# Patient Record
Sex: FEMALE | Race: WHITE | Employment: OTHER | URBAN - METROPOLITAN AREA
[De-identification: names, ages, dates, MRNs, and addresses within clinical notes are randomized per-mention and may not be internally consistent; named-entity substitution may affect disease eponyms.]

---

## 2019-10-11 ENCOUNTER — OFFICE VISIT (OUTPATIENT)
Dept: FAMILY MEDICINE CLINIC | Facility: CLINIC | Age: 84
End: 2019-10-11
Payer: MEDICARE

## 2019-10-11 VITALS
SYSTOLIC BLOOD PRESSURE: 123 MMHG | WEIGHT: 202 LBS | BODY MASS INDEX: 37.65 KG/M2 | DIASTOLIC BLOOD PRESSURE: 64 MMHG | RESPIRATION RATE: 18 BRPM | TEMPERATURE: 98 F | HEIGHT: 61.5 IN | HEART RATE: 57 BPM | OXYGEN SATURATION: 98 %

## 2019-10-11 DIAGNOSIS — B37.2 CANDIDAL INTERTRIGO: Primary | ICD-10-CM

## 2019-10-11 DIAGNOSIS — Z23 NEED FOR INFLUENZA VACCINATION: ICD-10-CM

## 2019-10-11 PROCEDURE — 90662 IIV NO PRSV INCREASED AG IM: CPT | Performed by: PHYSICIAN ASSISTANT

## 2019-10-11 PROCEDURE — G0008 ADMIN INFLUENZA VIRUS VAC: HCPCS | Performed by: PHYSICIAN ASSISTANT

## 2019-10-11 PROCEDURE — 99202 OFFICE O/P NEW SF 15 MIN: CPT | Performed by: PHYSICIAN ASSISTANT

## 2019-10-11 RX ORDER — CLOTRIMAZOLE 1 %
CREAM (GRAM) TOPICAL
Qty: 28 G | Refills: 0 | Status: SHIPPED | OUTPATIENT
Start: 2019-10-11 | End: 2019-12-09 | Stop reason: ALTCHOICE

## 2019-10-11 RX ORDER — PRAVASTATIN SODIUM 40 MG
40 TABLET ORAL NIGHTLY
COMMUNITY
End: 2019-12-09

## 2019-10-11 RX ORDER — OMEPRAZOLE 40 MG/1
CAPSULE, DELAYED RELEASE ORAL
COMMUNITY
Start: 2019-08-26 | End: 2019-12-09

## 2019-10-11 RX ORDER — FUROSEMIDE 20 MG/1
TABLET ORAL
COMMUNITY
Start: 2019-09-10 | End: 2019-12-10

## 2019-10-11 RX ORDER — POTASSIUM CHLORIDE 20 MEQ/1
TABLET, EXTENDED RELEASE ORAL
COMMUNITY
Start: 2019-07-18 | End: 2019-12-10

## 2019-10-11 RX ORDER — APIXABAN 5 MG/1
TABLET, FILM COATED ORAL
COMMUNITY
Start: 2019-08-15 | End: 2020-02-17

## 2019-10-11 RX ORDER — METOPROLOL SUCCINATE 50 MG/1
TABLET, EXTENDED RELEASE ORAL
COMMUNITY
Start: 2019-08-15 | End: 2019-12-10

## 2019-10-11 NOTE — PATIENT INSTRUCTIONS
Please follow up with your PCP if no improvement within 5-7 days. Go directly to the ER for any acute worsening of symptoms. Candida Skin Infection (Adult)  Candida is type of yeast. It grows naturally on the skin and in the mouth.  If it grows out of c with your healthcare provider, or as advised. Your rash will clear in 7 to 14 days. Call your healthcare provider if the rash is not gone after 14 days.   When to seek medical advice  Call your healthcare provider right away if any of these occur:  · Pain o

## 2019-10-11 NOTE — PROGRESS NOTES
CHIEF COMPLAINT:   Patient presents with:  Rash: redness on abdomen x 1 day      HPI:    Jordyn Polo is a 82year old female who presents for evaluation of a rash. Per patient rash started in the past 1 day. Rash has been constant since onset.   Patient ha LYMPH: Denies enlargement of the lymph nodes. MUSC/SKEL: Denies joint swelling or joint stiffness. GI: Denies abdominal pain, N/V/C/D. NEURO: Denies abnormal sensation, tingling of the skin, or numbness.     EXAM:   /64 (BP Location: Right arm, Sam Steve Please follow up with your PCP if no improvement within 5-7 days. Go directly to the ER for any acute worsening of symptoms. Candida Skin Infection (Adult)  Candida is type of yeast. It grows naturally on the skin and in the mouth.  If it grows out of c Follow up with your healthcare provider, or as advised. Your rash will clear in 7 to 14 days. Call your healthcare provider if the rash is not gone after 14 days.   When to seek medical advice  Call your healthcare provider right away if any of these occur:

## 2019-11-19 ENCOUNTER — HOSPITAL ENCOUNTER (OUTPATIENT)
Age: 84
Discharge: HOME | End: 2019-11-19
Attending: FAMILY MEDICINE
Payer: MEDICARE

## 2019-11-19 ENCOUNTER — OFFICE VISIT (OUTPATIENT)
Dept: FAMILY MEDICINE CLINIC | Facility: CLINIC | Age: 84
End: 2019-11-19
Payer: MEDICARE

## 2019-11-19 ENCOUNTER — APPOINTMENT (OUTPATIENT)
Dept: GENERAL RADIOLOGY | Age: 84
End: 2019-11-19
Attending: FAMILY MEDICINE
Payer: MEDICARE

## 2019-11-19 VITALS
HEIGHT: 62 IN | OXYGEN SATURATION: 98 % | BODY MASS INDEX: 37.73 KG/M2 | RESPIRATION RATE: 20 BRPM | HEART RATE: 70 BPM | DIASTOLIC BLOOD PRESSURE: 63 MMHG | TEMPERATURE: 98 F | SYSTOLIC BLOOD PRESSURE: 127 MMHG | WEIGHT: 205 LBS

## 2019-11-19 VITALS
BODY MASS INDEX: 38 KG/M2 | DIASTOLIC BLOOD PRESSURE: 80 MMHG | TEMPERATURE: 98 F | RESPIRATION RATE: 24 BRPM | WEIGHT: 202 LBS | OXYGEN SATURATION: 95 % | HEART RATE: 75 BPM | SYSTOLIC BLOOD PRESSURE: 122 MMHG

## 2019-11-19 DIAGNOSIS — J45.909 MILD REACTIVE AIRWAYS DISEASE, UNSPECIFIED WHETHER PERSISTENT: Primary | ICD-10-CM

## 2019-11-19 DIAGNOSIS — J84.10 CALCIFIED GRANULOMA OF LUNG (HCC): ICD-10-CM

## 2019-11-19 DIAGNOSIS — R06.2 WHEEZING: Primary | ICD-10-CM

## 2019-11-19 DIAGNOSIS — Z02.9 ENCOUNTERS FOR ADMINISTRATIVE PURPOSES: ICD-10-CM

## 2019-11-19 PROCEDURE — 94640 AIRWAY INHALATION TREATMENT: CPT | Performed by: PHYSICIAN ASSISTANT

## 2019-11-19 PROCEDURE — 99213 OFFICE O/P EST LOW 20 MIN: CPT

## 2019-11-19 PROCEDURE — 94664 DEMO&/EVAL PT USE INHALER: CPT

## 2019-11-19 PROCEDURE — 71046 X-RAY EXAM CHEST 2 VIEWS: CPT | Performed by: FAMILY MEDICINE

## 2019-11-19 PROCEDURE — 99204 OFFICE O/P NEW MOD 45 MIN: CPT

## 2019-11-19 RX ORDER — BENZONATATE 100 MG/1
100 CAPSULE ORAL 3 TIMES DAILY PRN
Qty: 15 CAPSULE | Refills: 0 | Status: SHIPPED | OUTPATIENT
Start: 2019-11-19 | End: 2019-11-24

## 2019-11-19 RX ORDER — IPRATROPIUM BROMIDE AND ALBUTEROL SULFATE 2.5; .5 MG/3ML; MG/3ML
3 SOLUTION RESPIRATORY (INHALATION) ONCE
Status: COMPLETED | OUTPATIENT
Start: 2019-11-19 | End: 2019-11-19

## 2019-11-19 RX ORDER — ALBUTEROL SULFATE 90 UG/1
2 AEROSOL, METERED RESPIRATORY (INHALATION) EVERY 6 HOURS PRN
Qty: 1 INHALER | Refills: 0 | Status: SHIPPED | OUTPATIENT
Start: 2019-11-19 | End: 2019-12-09 | Stop reason: ALTCHOICE

## 2019-11-19 RX ADMIN — IPRATROPIUM BROMIDE AND ALBUTEROL SULFATE 3 ML: 2.5; .5 SOLUTION RESPIRATORY (INHALATION) at 13:07:00

## 2019-11-19 NOTE — PROGRESS NOTES
S:  Patient reports cough x 4 days. She reports cough is productive. She admits to SOB and wheezing. No known fevers.       O:  /80 (BP Location: Left arm, Patient Position: Sitting, Cuff Size: adult)   Pulse 75   Temp 98.1 °F (36.7 °C) (Oral)   Re

## 2019-11-19 NOTE — ED INITIAL ASSESSMENT (HPI)
X4 days Pt c/o coughing and wheezing. Pt seen at [facility], given a NEB and states \"I do fell better after the NEB. \"  Denies fevers

## 2019-11-19 NOTE — ED PROVIDER NOTES
Patient Seen in: [id number] [facility]      History   Patient presents with:  Cough/URI    Stated Complaint: cough, wheezing    HPI  This is an 81 yo F here with complaints of coughing and wheezing.   She has had these symptoms X 4 days and was time  GAIT: Normal        ED Course   Labs Reviewed - No data to display    Orders Placed This Encounter      XR CHEST PA + LAT CHEST (CPT=71046) Once          Order Specific Question: What is the Relevant Clinical Indication / Reason for Exam?          An David Clark MD on 11/19/2019 at 13:30     Approved by: Judith Crockett MD on 11/19/2019 at 13:30          Reviewed Chest XR with the patient. Plan of care as below   Normal sats at this time and good respiratory effort.    MDI instructions provided   Patient v Cap  Take 1 capsule (100 mg total) by mouth 3 (three) times daily as needed for cough., Normal, Disp-15 capsule, R-0

## 2019-12-09 ENCOUNTER — OFFICE VISIT (OUTPATIENT)
Dept: FAMILY MEDICINE CLINIC | Facility: CLINIC | Age: 84
End: 2019-12-09
Payer: MEDICARE

## 2019-12-09 VITALS
BODY MASS INDEX: 39.85 KG/M2 | OXYGEN SATURATION: 98 % | HEIGHT: 60 IN | HEART RATE: 69 BPM | RESPIRATION RATE: 16 BRPM | SYSTOLIC BLOOD PRESSURE: 122 MMHG | WEIGHT: 203 LBS | DIASTOLIC BLOOD PRESSURE: 64 MMHG | TEMPERATURE: 98 F

## 2019-12-09 DIAGNOSIS — K21.9 GASTROESOPHAGEAL REFLUX DISEASE, ESOPHAGITIS PRESENCE NOT SPECIFIED: ICD-10-CM

## 2019-12-09 DIAGNOSIS — J40 BRONCHITIS: ICD-10-CM

## 2019-12-09 DIAGNOSIS — I25.10 ATHEROSCLEROSIS OF NATIVE CORONARY ARTERY OF NATIVE HEART WITHOUT ANGINA PECTORIS: ICD-10-CM

## 2019-12-09 DIAGNOSIS — Z95.5 H/O HEART ARTERY STENT: ICD-10-CM

## 2019-12-09 DIAGNOSIS — I10 ESSENTIAL HYPERTENSION, BENIGN: Primary | ICD-10-CM

## 2019-12-09 DIAGNOSIS — I48.91 ATRIAL FIBRILLATION WITH TACHYCARDIC VENTRICULAR RATE (HCC): ICD-10-CM

## 2019-12-09 DIAGNOSIS — Z23 NEED FOR VACCINATION: ICD-10-CM

## 2019-12-09 PROCEDURE — 99203 OFFICE O/P NEW LOW 30 MIN: CPT | Performed by: FAMILY MEDICINE

## 2019-12-09 PROCEDURE — G0009 ADMIN PNEUMOCOCCAL VACCINE: HCPCS | Performed by: FAMILY MEDICINE

## 2019-12-09 PROCEDURE — 90732 PPSV23 VACC 2 YRS+ SUBQ/IM: CPT | Performed by: FAMILY MEDICINE

## 2019-12-09 RX ORDER — OMEPRAZOLE 40 MG/1
40 CAPSULE, DELAYED RELEASE ORAL DAILY
Qty: 90 CAPSULE | Refills: 1 | Status: SHIPPED | OUTPATIENT
Start: 2019-12-09 | End: 2020-06-06

## 2019-12-09 RX ORDER — PRAVASTATIN SODIUM 40 MG
40 TABLET ORAL NIGHTLY
Qty: 90 TABLET | Refills: 1 | Status: SHIPPED | OUTPATIENT
Start: 2019-12-09 | End: 2019-12-10 | Stop reason: CLARIF

## 2019-12-09 NOTE — PROGRESS NOTES
Jeri Crespo is a 82year old female. Patient presents with:  Establish Care: stomach issues, pain, 2nd pneumonia shot    HPI:   Meron Niño presents to the office with complaints of upper respiratory tract infection, having congestion for 3 weeks.   She has had a Family History   Problem Relation Age of Onset   • Asthma Mother         later age onset   • Heart Disease Mother    • Other (congestive heart failure) Father    • Other (multiple myeloma) Sister 61   • Other (rheumatoid arthritis) Sister       Social [employer] than eliquis, that should come from cards. - may need alternative to toprol XL as it's not on formulary.        Orders Placed This Encounter      Pneumococcal 23, Adult (Pneumovax) (24419) (Dx V03.82/Z23)      Meds & Refills for this Visit:  Requested Pre

## 2019-12-10 ENCOUNTER — TELEPHONE (OUTPATIENT)
Dept: FAMILY MEDICINE CLINIC | Facility: CLINIC | Age: 84
End: 2019-12-10

## 2019-12-10 DIAGNOSIS — I10 ESSENTIAL HYPERTENSION, BENIGN: Primary | ICD-10-CM

## 2019-12-10 DIAGNOSIS — I48.91 ATRIAL FIBRILLATION WITH TACHYCARDIC VENTRICULAR RATE (HCC): ICD-10-CM

## 2019-12-10 DIAGNOSIS — I25.10 ATHEROSCLEROSIS OF NATIVE CORONARY ARTERY OF NATIVE HEART WITHOUT ANGINA PECTORIS: ICD-10-CM

## 2019-12-10 RX ORDER — METOPROLOL SUCCINATE 50 MG/1
50 TABLET, EXTENDED RELEASE ORAL DAILY
Qty: 90 TABLET | Refills: 0 | Status: SHIPPED | OUTPATIENT
Start: 2019-12-10 | End: 2020-03-10

## 2019-12-10 RX ORDER — POTASSIUM CHLORIDE 20 MEQ/1
20 TABLET, EXTENDED RELEASE ORAL DAILY
Qty: 90 TABLET | Refills: 0 | Status: SHIPPED | OUTPATIENT
Start: 2019-12-10 | End: 2020-03-21

## 2019-12-10 RX ORDER — ROSUVASTATIN CALCIUM 20 MG/1
20 TABLET, COATED ORAL NIGHTLY
Qty: 90 TABLET | Refills: 0 | Status: SHIPPED | OUTPATIENT
Start: 2019-12-10 | End: 2020-03-10

## 2019-12-10 RX ORDER — FUROSEMIDE 20 MG/1
20 TABLET ORAL DAILY
Qty: 90 TABLET | Refills: 0 | Status: SHIPPED | OUTPATIENT
Start: 2019-12-10 | End: 2020-03-10

## 2019-12-10 NOTE — TELEPHONE ENCOUNTER
Yrn Jay, DO Maddie Lugo Nurse             Can you confirm how she is taking her meds and what she needs sent to mail order. I would like her to get eliquis from cardiology. Let me know if she needs some to make it until her cardiology appt.

## 2019-12-10 NOTE — TELEPHONE ENCOUNTER
Spoke with patient and confirmed medications:  ASA 81 mg daily  OTC  Furosemide 20 mg daily   Potassium 20 meq daily  Metoprolol XL 50 mg daily  Omeprazole 40 mg daily  Rosuvastatin 20 mg daily  Eliquis 5 mg BID      Per KE, cancel pravastatin script sent

## 2020-01-01 ENCOUNTER — EXTERNAL RECORD (OUTPATIENT)
Dept: HEALTH INFORMATION MANAGEMENT | Facility: OTHER | Age: 85
End: 2020-01-01

## 2020-01-13 ENCOUNTER — MED REC SCAN ONLY (OUTPATIENT)
Dept: FAMILY MEDICINE CLINIC | Facility: CLINIC | Age: 85
End: 2020-01-13

## 2020-01-27 ENCOUNTER — OFFICE VISIT (OUTPATIENT)
Dept: FAMILY MEDICINE CLINIC | Facility: CLINIC | Age: 85
End: 2020-01-27
Payer: MEDICARE

## 2020-01-27 VITALS
RESPIRATION RATE: 16 BRPM | WEIGHT: 207 LBS | HEART RATE: 64 BPM | BODY MASS INDEX: 40 KG/M2 | TEMPERATURE: 97 F | SYSTOLIC BLOOD PRESSURE: 122 MMHG | DIASTOLIC BLOOD PRESSURE: 62 MMHG

## 2020-01-27 DIAGNOSIS — R10.84 GENERALIZED ABDOMINAL PAIN: ICD-10-CM

## 2020-01-27 DIAGNOSIS — R11.2 NON-INTRACTABLE VOMITING WITH NAUSEA, UNSPECIFIED VOMITING TYPE: Primary | ICD-10-CM

## 2020-01-27 DIAGNOSIS — K21.9 GASTROESOPHAGEAL REFLUX DISEASE, ESOPHAGITIS PRESENCE NOT SPECIFIED: ICD-10-CM

## 2020-01-27 LAB
ALBUMIN SERPL-MCNC: 3.4 G/DL (ref 3.4–5)
ALBUMIN/GLOB SERPL: 1 {RATIO} (ref 1–2)
ALP LIVER SERPL-CCNC: 68 U/L (ref 55–142)
ALT SERPL-CCNC: 20 U/L (ref 13–56)
ANION GAP SERPL CALC-SCNC: 2 MMOL/L (ref 0–18)
AST SERPL-CCNC: 14 U/L (ref 15–37)
BASOPHILS # BLD AUTO: 0.06 X10(3) UL (ref 0–0.2)
BASOPHILS NFR BLD AUTO: 1 %
BILIRUB SERPL-MCNC: 0.9 MG/DL (ref 0.1–2)
BUN BLD-MCNC: 14 MG/DL (ref 7–18)
BUN/CREAT SERPL: 16.3 (ref 10–20)
CALCIUM BLD-MCNC: 9.2 MG/DL (ref 8.5–10.1)
CHLORIDE SERPL-SCNC: 108 MMOL/L (ref 98–112)
CO2 SERPL-SCNC: 32 MMOL/L (ref 21–32)
CREAT BLD-MCNC: 0.86 MG/DL (ref 0.55–1.02)
DEPRECATED RDW RBC AUTO: 46.4 FL (ref 35.1–46.3)
EOSINOPHIL # BLD AUTO: 0.13 X10(3) UL (ref 0–0.7)
EOSINOPHIL NFR BLD AUTO: 2.2 %
ERYTHROCYTE [DISTWIDTH] IN BLOOD BY AUTOMATED COUNT: 13.3 % (ref 11–15)
GLOBULIN PLAS-MCNC: 3.3 G/DL (ref 2.8–4.4)
GLUCOSE BLD-MCNC: 91 MG/DL (ref 70–99)
HCT VFR BLD AUTO: 40.8 % (ref 35–48)
HGB BLD-MCNC: 13.1 G/DL (ref 12–16)
IMM GRANULOCYTES # BLD AUTO: 0.01 X10(3) UL (ref 0–1)
IMM GRANULOCYTES NFR BLD: 0.2 %
LIPASE SERPL-CCNC: 140 U/L (ref 73–393)
LYMPHOCYTES # BLD AUTO: 1.59 X10(3) UL (ref 1–4)
LYMPHOCYTES NFR BLD AUTO: 26.5 %
M PROTEIN MFR SERPL ELPH: 6.7 G/DL (ref 6.4–8.2)
MCH RBC QN AUTO: 30.3 PG (ref 26–34)
MCHC RBC AUTO-ENTMCNC: 32.1 G/DL (ref 31–37)
MCV RBC AUTO: 94.4 FL (ref 80–100)
MONOCYTES # BLD AUTO: 0.55 X10(3) UL (ref 0.1–1)
MONOCYTES NFR BLD AUTO: 9.2 %
NEUTROPHILS # BLD AUTO: 3.67 X10 (3) UL (ref 1.5–7.7)
NEUTROPHILS # BLD AUTO: 3.67 X10(3) UL (ref 1.5–7.7)
NEUTROPHILS NFR BLD AUTO: 60.9 %
OSMOLALITY SERPL CALC.SUM OF ELEC: 294 MOSM/KG (ref 275–295)
PATIENT FASTING Y/N/NP: NO
PLATELET # BLD AUTO: 185 10(3)UL (ref 150–450)
POTASSIUM SERPL-SCNC: 4.8 MMOL/L (ref 3.5–5.1)
RBC # BLD AUTO: 4.32 X10(6)UL (ref 3.8–5.3)
SODIUM SERPL-SCNC: 142 MMOL/L (ref 136–145)
WBC # BLD AUTO: 6 X10(3) UL (ref 4–11)

## 2020-01-27 PROCEDURE — 80053 COMPREHEN METABOLIC PANEL: CPT | Performed by: FAMILY MEDICINE

## 2020-01-27 PROCEDURE — 83690 ASSAY OF LIPASE: CPT | Performed by: FAMILY MEDICINE

## 2020-01-27 PROCEDURE — 99214 OFFICE O/P EST MOD 30 MIN: CPT | Performed by: FAMILY MEDICINE

## 2020-01-27 PROCEDURE — 85025 COMPLETE CBC W/AUTO DIFF WBC: CPT | Performed by: FAMILY MEDICINE

## 2020-01-27 NOTE — PROGRESS NOTES
Osie Goodell is a 82year old female. Patient presents with:  Stomach Pain: vomiting, diarrhea      HPI:   A couple weeks ago, after dinner. She was in bed and had saliva building up in her mouth. Got up, vomited and had diarrhea. No fevers.  Stomach felt g HEALTH: feels well no complaints other than as above   SKIN: denies any unusual skin lesions or rashes  RESPIRATORY: denies shortness of breath with exertion  CARDIOVASCULAR: denies chest pain on exertion  GI: denies abdominal pain, as above   NEURO: denie Future          Number of Occurrences: 1          Standing Expiration Date: 1/27/2021      Lipase [E]          Standing Status: Future          Number of Occurrences: 1          Standing Expiration Date: 1/27/2021      *Venipuncture              Meds & Ref

## 2020-02-17 NOTE — TELEPHONE ENCOUNTER
Pt called, needs refill on ELIQUIS 5 MG Oral Tab. Pharmacy Mail order [facility]. Pt also was wondering if we have sent for refills on all her other prescriptions as she had previously asked?    Please call pt at [phone]

## 2020-02-17 NOTE — TELEPHONE ENCOUNTER
Last OV 1/27/2020  Eliquis not filled previously by [facility]    Left message on voicemail/answering machine for patient to call office  Need to confirm dose

## 2020-02-17 NOTE — TELEPHONE ENCOUNTER
Called and spoke with patient who confirmed she takes Eliquis 5 mg BID . Routed to  to refill    Advised patient refills of her other medications were sent to mail order in December.  Patient states she will check to see what she has at home and let of

## 2020-02-19 ENCOUNTER — OFFICE VISIT (OUTPATIENT)
Dept: CARDIOLOGY | Facility: CLINIC | Age: 85
End: 2020-02-19

## 2020-02-19 VITALS
SYSTOLIC BLOOD PRESSURE: 120 MMHG | BODY MASS INDEX: 38.09 KG/M2 | WEIGHT: 207 LBS | HEART RATE: 60 BPM | DIASTOLIC BLOOD PRESSURE: 70 MMHG | HEIGHT: 62 IN

## 2020-02-19 DIAGNOSIS — I48.0 PAF (PAROXYSMAL ATRIAL FIBRILLATION) (CMD): ICD-10-CM

## 2020-02-19 DIAGNOSIS — R01.1 MURMUR: ICD-10-CM

## 2020-02-19 DIAGNOSIS — Z95.5 H/O HEART ARTERY STENT: Primary | ICD-10-CM

## 2020-02-19 DIAGNOSIS — I87.2 VENOUS INSUFFICIENCY (CHRONIC) (PERIPHERAL): ICD-10-CM

## 2020-02-19 DIAGNOSIS — I25.10 CORONARY ARTERY DISEASE INVOLVING NATIVE CORONARY ARTERY OF NATIVE HEART WITHOUT ANGINA PECTORIS: ICD-10-CM

## 2020-02-19 PROCEDURE — 99205 OFFICE O/P NEW HI 60 MIN: CPT | Performed by: INTERNAL MEDICINE

## 2020-02-19 RX ORDER — METOPROLOL SUCCINATE 25 MG/1
25 TABLET, EXTENDED RELEASE ORAL DAILY
Qty: 90 TABLET | Refills: 3 | OUTPATIENT
Start: 2020-02-19

## 2020-02-19 RX ORDER — POTASSIUM CHLORIDE 20 MEQ/1
20 TABLET, EXTENDED RELEASE ORAL
COMMUNITY
Start: 2018-05-17 | End: 2020-02-19 | Stop reason: SDUPTHER

## 2020-02-19 RX ORDER — FUROSEMIDE 20 MG/1
20 TABLET ORAL
COMMUNITY
Start: 2018-07-10 | End: 2020-02-19 | Stop reason: SDUPTHER

## 2020-02-19 RX ORDER — FUROSEMIDE 20 MG/1
20 TABLET ORAL DAILY
Qty: 90 TABLET | Refills: 3 | OUTPATIENT
Start: 2020-02-19

## 2020-02-19 RX ORDER — POTASSIUM CHLORIDE 20 MEQ/1
20 TABLET, EXTENDED RELEASE ORAL DAILY
Qty: 90 TABLET | Refills: 3 | OUTPATIENT
Start: 2020-02-19

## 2020-02-19 RX ORDER — OMEPRAZOLE 20 MG/1
20 CAPSULE, DELAYED RELEASE ORAL
COMMUNITY

## 2020-02-19 RX ORDER — ROSUVASTATIN CALCIUM 20 MG/1
20 TABLET, COATED ORAL DAILY
Qty: 90 TABLET | Refills: 3 | OUTPATIENT
Start: 2020-02-19

## 2020-02-19 RX ORDER — ROSUVASTATIN CALCIUM 20 MG/1
20 TABLET, COATED ORAL
COMMUNITY
Start: 2018-10-03 | End: 2020-02-19 | Stop reason: SDUPTHER

## 2020-02-19 RX ORDER — METOPROLOL SUCCINATE 25 MG/1
25 TABLET, EXTENDED RELEASE ORAL
COMMUNITY
Start: 2017-08-02 | End: 2020-02-19 | Stop reason: SDUPTHER

## 2020-02-19 ASSESSMENT — PATIENT HEALTH QUESTIONNAIRE - PHQ9
SUM OF ALL RESPONSES TO PHQ9 QUESTIONS 1 AND 2: 0
SUM OF ALL RESPONSES TO PHQ9 QUESTIONS 1 AND 2: 0
1. LITTLE INTEREST OR PLEASURE IN DOING THINGS: NOT AT ALL
2. FEELING DOWN, DEPRESSED OR HOPELESS: NOT AT ALL

## 2020-03-02 PROBLEM — R01.1 MURMUR: Status: ACTIVE | Noted: 2020-02-19

## 2020-03-02 PROBLEM — I95.9 HYPOTENSION: Status: ACTIVE | Noted: 2017-12-09

## 2020-03-02 PROBLEM — R94.39 ABNORMAL NUCLEAR STRESS TEST: Status: ACTIVE | Noted: 2018-10-03

## 2020-03-02 PROBLEM — I51.89 DIASTOLIC DYSFUNCTION: Status: ACTIVE | Noted: 2018-10-31

## 2020-03-02 PROBLEM — I48.0 PAROXYSMAL ATRIAL FIBRILLATION (HCC): Status: ACTIVE | Noted: 2017-12-24

## 2020-03-02 PROBLEM — Z79.899 ON STATIN THERAPY DUE TO RISK OF FUTURE CARDIOVASCULAR EVENT: Status: ACTIVE | Noted: 2018-10-31

## 2020-03-02 PROBLEM — I87.2 VENOUS INSUFFICIENCY (CHRONIC) (PERIPHERAL): Status: ACTIVE | Noted: 2020-02-19

## 2020-03-02 PROBLEM — I49.9 ARRHYTHMIA: Status: ACTIVE | Noted: 2017-12-09

## 2020-03-09 DIAGNOSIS — I48.91 ATRIAL FIBRILLATION WITH TACHYCARDIC VENTRICULAR RATE (HCC): ICD-10-CM

## 2020-03-09 DIAGNOSIS — I25.10 ATHEROSCLEROSIS OF NATIVE CORONARY ARTERY OF NATIVE HEART WITHOUT ANGINA PECTORIS: ICD-10-CM

## 2020-03-09 DIAGNOSIS — I10 ESSENTIAL HYPERTENSION, BENIGN: ICD-10-CM

## 2020-03-10 RX ORDER — FUROSEMIDE 20 MG/1
TABLET ORAL
Qty: 90 TABLET | Refills: 3 | Status: SHIPPED | OUTPATIENT
Start: 2020-03-10 | End: 2021-03-04

## 2020-03-10 RX ORDER — METOPROLOL SUCCINATE 50 MG/1
TABLET, EXTENDED RELEASE ORAL
Qty: 90 TABLET | Refills: 3 | Status: SHIPPED | OUTPATIENT
Start: 2020-03-10 | End: 2021-03-04

## 2020-03-10 RX ORDER — ROSUVASTATIN CALCIUM 20 MG/1
TABLET, COATED ORAL
Qty: 90 TABLET | Refills: 3 | Status: SHIPPED | OUTPATIENT
Start: 2020-03-10 | End: 2021-03-04

## 2020-03-10 NOTE — TELEPHONE ENCOUNTER
Last refill on Metoprolol #90 with 0 refills on 12 10 2019   Last refill on Furosemide #90 with 0 refills on 12 10 2019   Last refill on Rosuvastatin #90 with 0 refills on 12 10 2019  Do not see a lipid Panel on file   Last OV on 1 27 2020 - Sick visit

## 2020-03-20 DIAGNOSIS — I10 ESSENTIAL HYPERTENSION, BENIGN: ICD-10-CM

## 2020-03-21 RX ORDER — POTASSIUM CHLORIDE 20 MEQ/1
TABLET, EXTENDED RELEASE ORAL
Qty: 90 TABLET | Refills: 3 | Status: SHIPPED | OUTPATIENT
Start: 2020-03-21 | End: 2021-03-16

## 2020-03-21 NOTE — TELEPHONE ENCOUNTER
Refill request per protocol: none  Last refilled: 12/10/19 #90  Last OV: 1/27/20  No future appt  Routed to provider to advise

## 2020-05-12 NOTE — TELEPHONE ENCOUNTER
apixaban (ELIQUIS) 5 MG Oral Tab    Pt would like a 90 day supply.    Pt [facility]   [facility] [phone], [phone]

## 2020-06-06 DIAGNOSIS — K21.9 GASTROESOPHAGEAL REFLUX DISEASE, ESOPHAGITIS PRESENCE NOT SPECIFIED: ICD-10-CM

## 2020-06-06 RX ORDER — OMEPRAZOLE 40 MG/1
CAPSULE, DELAYED RELEASE ORAL
Qty: 90 CAPSULE | Refills: 3 | Status: SHIPPED | OUTPATIENT
Start: 2020-06-06 | End: 2021-07-26

## 2020-06-06 NOTE — TELEPHONE ENCOUNTER
Pls confirm dose she is taking. She had a recent telemed visit with Dr. Radha Wayne. How is she taking the omeprazole?

## 2020-08-10 RX ORDER — APIXABAN 5 MG/1
TABLET, FILM COATED ORAL
Qty: 180 TABLET | Refills: 3 | Status: SHIPPED | OUTPATIENT
Start: 2020-08-10 | End: 2021-08-05

## 2020-08-11 ENCOUNTER — TELEPHONE (OUTPATIENT)
Dept: FAMILY MEDICINE CLINIC | Facility: CLINIC | Age: 85
End: 2020-08-11

## 2020-08-11 NOTE — TELEPHONE ENCOUNTER
Fax from [employer] requesting information to determine coverage for eliquis.     Form in KE bin

## 2020-08-11 NOTE — TELEPHONE ENCOUNTER
Patient returned call. States she has never been on coumadin and does not recall being on any other blood thinner.

## 2020-08-13 ENCOUNTER — TELEPHONE (OUTPATIENT)
Dept: FAMILY MEDICINE CLINIC | Facility: CLINIC | Age: 85
End: 2020-08-13

## 2020-08-13 NOTE — TELEPHONE ENCOUNTER
Patient calling to schedule appt for head pain x 1 week. Spoke with patient who states she has had left sided head pain x 1 week. Rates pain 2/10  Pain is constant. Pain has not worsened.     appt scheduled  Future Appointments   Date Time Provider Dep

## 2020-08-13 NOTE — TELEPHONE ENCOUNTER
Fax from [employer] stating patient eliquis has been approved from [id number]    Patient notified and verbalized understanding.

## 2020-08-13 NOTE — TELEPHONE ENCOUNTER
Will see her tomorrow. If worsening, unbearable, or worst headache of her life, then go to ER right away.

## 2020-08-14 ENCOUNTER — OFFICE VISIT (OUTPATIENT)
Dept: FAMILY MEDICINE CLINIC | Facility: CLINIC | Age: 85
End: 2020-08-14
Payer: MEDICARE

## 2020-08-14 VITALS
WEIGHT: 208 LBS | TEMPERATURE: 98 F | BODY MASS INDEX: 39 KG/M2 | RESPIRATION RATE: 16 BRPM | HEART RATE: 64 BPM | DIASTOLIC BLOOD PRESSURE: 70 MMHG | SYSTOLIC BLOOD PRESSURE: 130 MMHG

## 2020-08-14 DIAGNOSIS — L98.9 SKIN LESION OF SCALP: ICD-10-CM

## 2020-08-14 DIAGNOSIS — G44.209 TENSION HEADACHE: Primary | ICD-10-CM

## 2020-08-14 PROCEDURE — 99214 OFFICE O/P EST MOD 30 MIN: CPT | Performed by: FAMILY MEDICINE

## 2020-08-14 RX ORDER — BUPRENORPHINE HCL 8 MG/1
1 TABLET SUBLINGUAL DAILY
COMMUNITY

## 2020-08-14 NOTE — PROGRESS NOTES
James Casas is a 82year old female. Patient presents with:  Headache: left occipital area times 1 week 2/10 pain constant      HPI:   Headache 1-2/10 across the back of her head. Yesterday felt her heart pulsing in back of her head.    Tylenol helped so Last 6 Encounters:  08/14/20 : 130/70  03/02/20 : 128/71  01/27/20 : 122/62  12/09/19 : 122/64  11/19/19 : 127/63  11/19/19 : 122/80      Wt Readings from Last 6 Encounters:  08/14/20 : 208 lb (94.3 kg)  05/07/20 : 204 lb (92.5 kg)  03/02/20 : 206 lb 3.2 o

## 2020-12-07 ENCOUNTER — OFFICE VISIT (OUTPATIENT)
Dept: FAMILY MEDICINE CLINIC | Facility: CLINIC | Age: 85
End: 2020-12-07
Payer: MEDICARE

## 2020-12-07 VITALS
SYSTOLIC BLOOD PRESSURE: 120 MMHG | TEMPERATURE: 98 F | WEIGHT: 205 LBS | DIASTOLIC BLOOD PRESSURE: 62 MMHG | RESPIRATION RATE: 16 BRPM | OXYGEN SATURATION: 99 % | HEIGHT: 62 IN | BODY MASS INDEX: 37.73 KG/M2 | HEART RATE: 66 BPM

## 2020-12-07 DIAGNOSIS — Z79.899 ON STATIN THERAPY DUE TO RISK OF FUTURE CARDIOVASCULAR EVENT: ICD-10-CM

## 2020-12-07 DIAGNOSIS — R01.1 MURMUR: ICD-10-CM

## 2020-12-07 DIAGNOSIS — I25.2 H/O ACUTE MYOCARDIAL INFARCTION: ICD-10-CM

## 2020-12-07 DIAGNOSIS — Z95.5 H/O HEART ARTERY STENT: ICD-10-CM

## 2020-12-07 DIAGNOSIS — I87.2 VENOUS INSUFFICIENCY (CHRONIC) (PERIPHERAL): ICD-10-CM

## 2020-12-07 DIAGNOSIS — I48.0 PAROXYSMAL ATRIAL FIBRILLATION (HCC): ICD-10-CM

## 2020-12-07 DIAGNOSIS — Z95.5 PRESENCE OF CORONARY ANGIOPLASTY IMPLANT AND GRAFT: ICD-10-CM

## 2020-12-07 DIAGNOSIS — K21.9 GASTROESOPHAGEAL REFLUX DISEASE WITHOUT ESOPHAGITIS: ICD-10-CM

## 2020-12-07 DIAGNOSIS — Z00.00 ENCOUNTER FOR ANNUAL HEALTH EXAMINATION: Primary | ICD-10-CM

## 2020-12-07 DIAGNOSIS — I25.10 CORONARY ARTERY DISEASE INVOLVING NATIVE CORONARY ARTERY OF NATIVE HEART WITHOUT ANGINA PECTORIS: ICD-10-CM

## 2020-12-07 DIAGNOSIS — I10 ESSENTIAL HYPERTENSION, BENIGN: ICD-10-CM

## 2020-12-07 DIAGNOSIS — I51.89 DIASTOLIC DYSFUNCTION: ICD-10-CM

## 2020-12-07 PROBLEM — R94.39 ABNORMAL NUCLEAR STRESS TEST: Status: RESOLVED | Noted: 2018-10-03 | Resolved: 2020-12-07

## 2020-12-07 PROBLEM — I95.9 HYPOTENSION: Status: RESOLVED | Noted: 2017-12-09 | Resolved: 2020-12-07

## 2020-12-07 PROBLEM — I49.9 ARRHYTHMIA: Status: RESOLVED | Noted: 2017-12-09 | Resolved: 2020-12-07

## 2020-12-07 PROCEDURE — 85025 COMPLETE CBC W/AUTO DIFF WBC: CPT | Performed by: FAMILY MEDICINE

## 2020-12-07 PROCEDURE — 80053 COMPREHEN METABOLIC PANEL: CPT | Performed by: FAMILY MEDICINE

## 2020-12-07 PROCEDURE — G0439 PPPS, SUBSEQ VISIT: HCPCS | Performed by: FAMILY MEDICINE

## 2020-12-07 PROCEDURE — 80061 LIPID PANEL: CPT | Performed by: FAMILY MEDICINE

## 2020-12-07 NOTE — PROGRESS NOTES
HPI:   Varun Garza is a 82year old female who presents for a Medicare Subsequent Annual Wellness visit (Pt already had Initial Annual Wellness). Would like to get off of medications. Asking to stop statin and metoprolol and water pill.    Has always forms available to patient in AVS       She smoked tobacco in the past but quit greater than 12 months ago.   Social History    Tobacco Use      Smoking status: Former Smoker        Packs/day: 0.50        Years: 30.00        Pack years: 15      Smokeless to CREATSERUM 0.73 12/07/2020    GLU 98 12/07/2020        CBC  (most recent labs)   Lab Results   Component Value Date    WBC 5.5 12/07/2020    HGB 13.5 12/07/2020    .0 12/07/2020        ALLERGIES:   She is allergic to lipitor [atorvastatin].     Minal Lucio exertion  CARDIOVASCULAR: denies chest pain on exertion  GI: denies abdominal pain, denies heartburn  : denies dysuria, vaginal discharge or itching, + urinary incontinence, she wears pads most of the time for leaks.     MUSCULOSKELETAL: denies back pain atraumatic, no cyanosis or edema   Pulses: 2+ and symmetric   Skin: Skin color, texture, turgor normal, no rashes or lesions   Lymph nodes: Cervical, supraclavicular, and axillary nodes normal   Neurologic: Normal       Vaccination History     Immunization Diet assessment: good     PLAN:  The patient indicates understanding of these issues and agrees to the plan. Reinforced healthy diet, lifestyle, and exercise. Return for Wellness Visit.      Yanet Raymundo DO, 12/7/2020     General Health     In the older at high risk There are no preventive care reminders to display for this patient. Update Health Maintenance if applicable    Chlamydia  Annually if high risk No results found for: CHLAMYDIA No flowsheet data found.     Screening Mammogram      Vandana Oquendo [address] [[id number]]

## 2020-12-07 NOTE — PATIENT INSTRUCTIONS
Avis Rios's SCREENING SCHEDULE   Tests on this list are recommended by your physician but may not be covered, or covered at this frequency, by your insurer. Please check with your insurance carrier before scheduling to verify coverage.    PREVENTATIVE Sigmoidoscopy Screen  Covered every 5 years No results found for this or any previous visit. No flowsheet data found. Fecal Occult Blood   Covered Annually No results found for: FOB, OCCULTSTOOL No flowsheet data found.      Barium Enema-   uncomfortabl on 12/09/19   • PNEUMOCOCCAL IMM (PNEUMOVAX)    Please get once after your 65th birthday    Hepatitis B for Moderate/High Risk       No orders found for this or any previous visit.  Medium/high risk factors:   End-stage renal disease   Hemophiliacs who rece

## 2020-12-09 ENCOUNTER — TELEPHONE (OUTPATIENT)
Dept: FAMILY MEDICINE CLINIC | Facility: CLINIC | Age: 85
End: 2020-12-09

## 2020-12-09 NOTE — TELEPHONE ENCOUNTER
Pt called, Pt returning our call from yesterday-no phone note in system. Please call pt at [phone].

## 2021-02-01 DIAGNOSIS — Z23 NEED FOR VACCINATION: ICD-10-CM

## 2021-02-11 ENCOUNTER — TELEPHONE (OUTPATIENT)
Dept: FAMILY MEDICINE CLINIC | Facility: CLINIC | Age: 86
End: 2021-02-11

## 2021-02-11 NOTE — TELEPHONE ENCOUNTER
Pt called to request a refill of the following medication in a 90 day supply.     ELIQUIS 5 MG Oral Tab    [address] [phone], [phone]

## 2021-02-25 ENCOUNTER — TELEPHONE (OUTPATIENT)
Dept: CARDIOLOGY | Age: 86
End: 2021-02-25

## 2021-03-04 DIAGNOSIS — I25.10 ATHEROSCLEROSIS OF NATIVE CORONARY ARTERY OF NATIVE HEART WITHOUT ANGINA PECTORIS: ICD-10-CM

## 2021-03-04 DIAGNOSIS — I48.91 ATRIAL FIBRILLATION WITH TACHYCARDIC VENTRICULAR RATE (HCC): ICD-10-CM

## 2021-03-04 DIAGNOSIS — I10 ESSENTIAL HYPERTENSION, BENIGN: ICD-10-CM

## 2021-03-04 RX ORDER — FUROSEMIDE 20 MG/1
TABLET ORAL
Qty: 90 TABLET | Refills: 3 | Status: SHIPPED | OUTPATIENT
Start: 2021-03-04 | End: 2022-01-27

## 2021-03-04 RX ORDER — ROSUVASTATIN CALCIUM 20 MG/1
TABLET, COATED ORAL
Qty: 90 TABLET | Refills: 3 | Status: SHIPPED | OUTPATIENT
Start: 2021-03-04 | End: 2022-01-27

## 2021-03-04 RX ORDER — METOPROLOL SUCCINATE 50 MG/1
TABLET, EXTENDED RELEASE ORAL
Qty: 90 TABLET | Refills: 3 | Status: SHIPPED | OUTPATIENT
Start: 2021-03-04 | End: 2022-01-27

## 2021-03-04 NOTE — TELEPHONE ENCOUNTER
Hypertension Medications Protocol Htcvjh4203/04/2021 12:04 AM   CMP or BMP in past 12 months Protocol Details    Last serum creatinine< 2.0     Appointment in past 6 or next 3 months         Cholesterol Medication Protocol [id number]/04/2021 12:04 AM   ALT < 8

## 2021-03-16 DIAGNOSIS — I10 ESSENTIAL HYPERTENSION, BENIGN: ICD-10-CM

## 2021-03-16 RX ORDER — POTASSIUM CHLORIDE 20 MEQ/1
TABLET, EXTENDED RELEASE ORAL
Qty: 90 TABLET | Refills: 3 | Status: SHIPPED | OUTPATIENT
Start: 2021-03-16 | End: 2022-01-27

## 2021-03-16 NOTE — TELEPHONE ENCOUNTER
Protocol: none  Last refilled 3/21/20 #90 with 3 RF  LOV with KE 12/7/20  No future appt  Routed to PCP to advise

## 2021-03-18 ENCOUNTER — TELEPHONE (OUTPATIENT)
Dept: FAMILY MEDICINE CLINIC | Facility: CLINIC | Age: 86
End: 2021-03-18

## 2021-03-18 ENCOUNTER — OFFICE VISIT (OUTPATIENT)
Dept: FAMILY MEDICINE CLINIC | Facility: CLINIC | Age: 86
End: 2021-03-18
Payer: MEDICARE

## 2021-03-18 VITALS
WEIGHT: 207 LBS | RESPIRATION RATE: 16 BRPM | TEMPERATURE: 98 F | DIASTOLIC BLOOD PRESSURE: 60 MMHG | HEART RATE: 68 BPM | BODY MASS INDEX: 38 KG/M2 | OXYGEN SATURATION: 97 % | SYSTOLIC BLOOD PRESSURE: 124 MMHG

## 2021-03-18 DIAGNOSIS — K57.92 DIVERTICULITIS: Primary | ICD-10-CM

## 2021-03-18 DIAGNOSIS — R10.32 LEFT LOWER QUADRANT ABDOMINAL PAIN: ICD-10-CM

## 2021-03-18 LAB
BASOPHILS # BLD AUTO: 0.02 X10(3) UL (ref 0–0.2)
BASOPHILS NFR BLD AUTO: 0.3 %
DEPRECATED RDW RBC AUTO: 47.3 FL (ref 35.1–46.3)
EOSINOPHIL # BLD AUTO: 0.1 X10(3) UL (ref 0–0.7)
EOSINOPHIL NFR BLD AUTO: 1.4 %
ERYTHROCYTE [DISTWIDTH] IN BLOOD BY AUTOMATED COUNT: 13.6 % (ref 11–15)
HCT VFR BLD AUTO: 39.2 %
HGB BLD-MCNC: 12.4 G/DL
IMM GRANULOCYTES # BLD AUTO: 0.01 X10(3) UL (ref 0–1)
IMM GRANULOCYTES NFR BLD: 0.1 %
LYMPHOCYTES # BLD AUTO: 1.28 X10(3) UL (ref 1–4)
LYMPHOCYTES NFR BLD AUTO: 18.5 %
MCH RBC QN AUTO: 30 PG (ref 26–34)
MCHC RBC AUTO-ENTMCNC: 31.6 G/DL (ref 31–37)
MCV RBC AUTO: 94.9 FL
MONOCYTES # BLD AUTO: 0.71 X10(3) UL (ref 0.1–1)
MONOCYTES NFR BLD AUTO: 10.2 %
NEUTROPHILS # BLD AUTO: 4.81 X10 (3) UL (ref 1.5–7.7)
NEUTROPHILS # BLD AUTO: 4.81 X10(3) UL (ref 1.5–7.7)
NEUTROPHILS NFR BLD AUTO: 69.5 %
PLATELET # BLD AUTO: 147 10(3)UL (ref 150–450)
RBC # BLD AUTO: 4.13 X10(6)UL
WBC # BLD AUTO: 6.9 X10(3) UL (ref 4–11)

## 2021-03-18 PROCEDURE — 80053 COMPREHEN METABOLIC PANEL: CPT | Performed by: FAMILY MEDICINE

## 2021-03-18 PROCEDURE — 83690 ASSAY OF LIPASE: CPT | Performed by: FAMILY MEDICINE

## 2021-03-18 PROCEDURE — 99214 OFFICE O/P EST MOD 30 MIN: CPT | Performed by: FAMILY MEDICINE

## 2021-03-18 PROCEDURE — 85025 COMPLETE CBC W/AUTO DIFF WBC: CPT | Performed by: FAMILY MEDICINE

## 2021-03-18 RX ORDER — METRONIDAZOLE 500 MG/1
500 TABLET ORAL 3 TIMES DAILY
Qty: 30 TABLET | Refills: 0 | Status: SHIPPED | OUTPATIENT
Start: 2021-03-18 | End: 2021-03-28

## 2021-03-18 RX ORDER — CIPROFLOXACIN 500 MG/1
500 TABLET, FILM COATED ORAL 2 TIMES DAILY
Qty: 20 TABLET | Refills: 0 | Status: SHIPPED | OUTPATIENT
Start: 2021-03-18 | End: 2021-03-28

## 2021-03-18 NOTE — TELEPHONE ENCOUNTER
Patient notified and scheduled appt    Future Appointments   Date Time Provider Jaye Tripathi   3/18/2021  3:30 PM Tio Tilley [facility] WALTER Healy

## 2021-03-18 NOTE — TELEPHONE ENCOUNTER
I would rather see her before treating. Could she make it at 3:30? Otherwise I can probably get her in at 4:15 or so.

## 2021-03-18 NOTE — TELEPHONE ENCOUNTER
Pt is having some diverticulitis problems. She would like to know if she can be seen later today, he doesn't want to take her  with or leave him home alone.      Please return call to [phone]

## 2021-03-18 NOTE — TELEPHONE ENCOUNTER
Patient states she has pain in her left side. States it feels like diverticulitis. Has had this before when she lived in [address]. Pain is on the lower left side. Started last night in bed. Activity does not make it better or worse.   Aquiles

## 2021-03-18 NOTE — PROGRESS NOTES
Bebe Ortega is a 82year old female. Patient presents with:  Abdominal Pain: left abdominal pain      HPI:   Has h/o diverticulitis. Has been hospitalized in the past.   Has had 3-4 episodes, this is the first one she's had for 20 yrs.      Ate a lot of Years: 30.00        Pack years: 15      Smokeless tobacco: Former User      Tobacco comment: quit 30 yrs ago    Vaping Use      Vaping Use: Never used    Alcohol use:  Yes      Alcohol/week: 2.0 standard drinks      Types: 2 Glasses of wine per week    Drug (two) times daily for 10 days. Left lower quadrant abdominal pain  -     CBC WITH DIFFERENTIAL WITH PLATELET; Future  -     COMP METABOLIC PANEL (14); Future  -     LIPASE; Future  -     VENIPUNCTURE    labs as ordered. meds as above.    Go to ER if wo

## 2021-03-19 LAB
ALBUMIN SERPL-MCNC: 3.5 G/DL (ref 3.4–5)
ALBUMIN/GLOB SERPL: 1.2 {RATIO} (ref 1–2)
ALP LIVER SERPL-CCNC: 64 U/L
ALT SERPL-CCNC: 22 U/L
ANION GAP SERPL CALC-SCNC: 3 MMOL/L (ref 0–18)
AST SERPL-CCNC: 15 U/L (ref 15–37)
BILIRUB SERPL-MCNC: 1.3 MG/DL (ref 0.1–2)
BUN BLD-MCNC: 14 MG/DL (ref 7–18)
BUN/CREAT SERPL: 17.5 (ref 10–20)
CALCIUM BLD-MCNC: 8.8 MG/DL (ref 8.5–10.1)
CHLORIDE SERPL-SCNC: 106 MMOL/L (ref 98–112)
CO2 SERPL-SCNC: 32 MMOL/L (ref 21–32)
CREAT BLD-MCNC: 0.8 MG/DL
GLOBULIN PLAS-MCNC: 3 G/DL (ref 2.8–4.4)
GLUCOSE BLD-MCNC: 120 MG/DL (ref 70–99)
LIPASE SERPL-CCNC: 106 U/L (ref 73–393)
M PROTEIN MFR SERPL ELPH: 6.5 G/DL (ref 6.4–8.2)
OSMOLALITY SERPL CALC.SUM OF ELEC: 294 MOSM/KG (ref 275–295)
PATIENT FASTING Y/N/NP: NO
POTASSIUM SERPL-SCNC: 4 MMOL/L (ref 3.5–5.1)
SODIUM SERPL-SCNC: 141 MMOL/L (ref 136–145)

## 2021-03-20 ENCOUNTER — TELEPHONE (OUTPATIENT)
Dept: FAMILY MEDICINE CLINIC | Facility: CLINIC | Age: 86
End: 2021-03-20

## 2021-03-20 NOTE — TELEPHONE ENCOUNTER
It's probably the Metronidazole, is she taking it with food? And is she drinking any ETOH while she's taking it? If she's doing everything right , then she might just try taking JUst  the CIPRO.

## 2021-03-20 NOTE — TELEPHONE ENCOUNTER
Pt was seen in the office on 03/18 for her diverticulitis. KE gave her two medication to start taking. Pt says they are making her sick.  Please call back

## 2021-03-20 NOTE — TELEPHONE ENCOUNTER
PT is taking Metronidazole with food. No alcohol. Pt will try just the Cipro. She is to call back if she does not start feeling better next week.

## 2021-03-26 ENCOUNTER — TELEPHONE (OUTPATIENT)
Dept: FAMILY MEDICINE CLINIC | Facility: CLINIC | Age: 86
End: 2021-03-26

## 2021-03-26 NOTE — TELEPHONE ENCOUNTER
Patient notified and verbalized understanding.      Patient asking if she can stop the cipro  Routed to KE to advise

## 2021-03-26 NOTE — TELEPHONE ENCOUNTER
Pt called, said she was told to stop one medication and start another medication, but she feels it maybe too strong as she thinks there is blood in her stool. Please call pt at [phone].   Pt has a hair appt this afternoon at 2 and could stop by for a

## 2021-03-26 NOTE — TELEPHONE ENCOUNTER
Spoke with patient who states for the last 3 days she has had black formed/soft stool. States symptoms started 3 days ago. Patient states abd pain has stopped. Was on 3 day liquid diet, started solid foods 2 days ago.   Stools were black prior to startin

## 2021-03-26 NOTE — TELEPHONE ENCOUNTER
Advised pt to stop cipro and reinforced other rec's from below. Pt asked if she has to take iron pills because it makes stools dark and her stools are already dark. Per Dr. Devyn Stratton, ok to hold off on OTC iron for now. Pt verbalized understanding.  No furt

## 2021-04-01 ENCOUNTER — OFFICE VISIT (OUTPATIENT)
Dept: FAMILY MEDICINE CLINIC | Facility: CLINIC | Age: 86
End: 2021-04-01
Payer: MEDICARE

## 2021-04-01 ENCOUNTER — TELEPHONE (OUTPATIENT)
Dept: FAMILY MEDICINE CLINIC | Facility: CLINIC | Age: 86
End: 2021-04-01

## 2021-04-01 VITALS
HEART RATE: 59 BPM | WEIGHT: 202 LBS | RESPIRATION RATE: 16 BRPM | DIASTOLIC BLOOD PRESSURE: 60 MMHG | BODY MASS INDEX: 37.17 KG/M2 | HEIGHT: 62 IN | SYSTOLIC BLOOD PRESSURE: 122 MMHG | TEMPERATURE: 97 F | OXYGEN SATURATION: 98 %

## 2021-04-01 DIAGNOSIS — R19.5 DARK STOOLS: Primary | ICD-10-CM

## 2021-04-01 PROCEDURE — 99213 OFFICE O/P EST LOW 20 MIN: CPT | Performed by: FAMILY MEDICINE

## 2021-04-01 NOTE — TELEPHONE ENCOUNTER
See tel enc 3/26/21. Was to call if still having blood in stool.     Routed to KE to advise if OV needed or order stool test

## 2021-04-01 NOTE — PROGRESS NOTES
Bebe Ortega is a 82year old female. Patient presents with:  Stool: blood in stool      HPI:   Has been having black stools, started 2 weeks ago. I started her on abx for diverticulitis. The pain is gone. She was able to tolerate only a few days of abx. Read and agree with plan. Thanks David    124/60  12/07/20 : 120/62  08/14/20 : 130/70  03/02/20 : 128/71  01/27/20 : 122/62      Wt Readings from Last 6 Encounters:  04/01/21 : 202 lb (91.6 kg)  03/18/21 : 207 lb (93.9 kg)  12/07/20 : 205 lb (93 kg)  08/14/20 : 208 lb (94.3 kg)  05/07/20 : 204 lb

## 2021-04-01 NOTE — TELEPHONE ENCOUNTER
Patient notified and verbalized understanding.     States she is still having blood in stool but it is getting better    Future Appointments   Date Time Provider Jaye Tripathi   4/1/2021  1:30 PM DO MAKSIM Frazier

## 2021-04-01 NOTE — TELEPHONE ENCOUNTER
Call and see how she is doing. If she's still having blood in stool, I would like to see her, check some labs, get vitals. If she is profusely bleeding, then go to ER.

## 2021-04-14 ENCOUNTER — NURSE ONLY (OUTPATIENT)
Dept: FAMILY MEDICINE CLINIC | Facility: CLINIC | Age: 86
End: 2021-04-14
Payer: MEDICARE

## 2021-04-14 DIAGNOSIS — R19.5 DARK STOOLS: ICD-10-CM

## 2021-04-14 PROCEDURE — 82274 ASSAY TEST FOR BLOOD FECAL: CPT | Performed by: FAMILY MEDICINE

## 2021-04-16 ENCOUNTER — TELEPHONE (OUTPATIENT)
Dept: FAMILY MEDICINE CLINIC | Facility: CLINIC | Age: 86
End: 2021-04-16

## 2021-04-16 DIAGNOSIS — R19.5 POSITIVE FIT (FECAL IMMUNOCHEMICAL TEST): ICD-10-CM

## 2021-04-16 DIAGNOSIS — R19.5 DARK STOOLS: Primary | ICD-10-CM

## 2021-04-16 NOTE — TELEPHONE ENCOUNTER
Called re: positive FIT test in stool. Discussed need for further testing, referral to GI. Requested me to [facility] referral info to her, but she does not have [employer] account set up. Can you call and leave message with her with referral info.

## 2021-04-26 ENCOUNTER — OFFICE VISIT (OUTPATIENT)
Dept: FAMILY MEDICINE CLINIC | Facility: CLINIC | Age: 86
End: 2021-04-26
Payer: MEDICARE

## 2021-04-26 VITALS
OXYGEN SATURATION: 98 % | HEIGHT: 62 IN | BODY MASS INDEX: 38.09 KG/M2 | SYSTOLIC BLOOD PRESSURE: 124 MMHG | RESPIRATION RATE: 16 BRPM | HEART RATE: 63 BPM | TEMPERATURE: 97 F | DIASTOLIC BLOOD PRESSURE: 60 MMHG | WEIGHT: 207 LBS

## 2021-04-26 DIAGNOSIS — M79.10 MUSCULAR PAIN: ICD-10-CM

## 2021-04-26 DIAGNOSIS — R19.5 POSITIVE FIT (FECAL IMMUNOCHEMICAL TEST): ICD-10-CM

## 2021-04-26 DIAGNOSIS — M79.605 PAIN OF LEFT LOWER EXTREMITY: Primary | ICD-10-CM

## 2021-04-26 PROCEDURE — 99214 OFFICE O/P EST MOD 30 MIN: CPT | Performed by: FAMILY MEDICINE

## 2021-04-26 NOTE — PROGRESS NOTES
Erlin Guzman is a 82year old female. Patient presents with:  Leg Pain: pain in left leg  Hernia: possible hernia, left lower abdomen      HPI:   Still has dark stools. Scheduled to see Dr. Grace Mg 5/26/21.      Yrs ago was told she has a blockage with some from Last 6 Encounters:  04/26/21 : 207 lb (93.9 kg)  04/01/21 : 202 lb (91.6 kg)  03/18/21 : 207 lb (93.9 kg)  12/07/20 : 205 lb (93 kg)  08/14/20 : 208 lb (94.3 kg)  05/07/20 : 204 lb (92.5 kg)      REVIEW OF SYSTEMS:   GENERAL HEALTH: feels well no comp

## 2021-05-11 ENCOUNTER — HOSPITAL ENCOUNTER (OUTPATIENT)
Dept: GENERAL RADIOLOGY | Age: 86
Discharge: HOME | End: 2021-05-11
Attending: FAMILY MEDICINE
Payer: MEDICARE

## 2021-05-11 ENCOUNTER — OFFICE VISIT (OUTPATIENT)
Dept: FAMILY MEDICINE CLINIC | Facility: CLINIC | Age: 86
End: 2021-05-11
Payer: MEDICARE

## 2021-05-11 VITALS
HEART RATE: 69 BPM | RESPIRATION RATE: 16 BRPM | SYSTOLIC BLOOD PRESSURE: 118 MMHG | TEMPERATURE: 98 F | WEIGHT: 203 LBS | HEIGHT: 62 IN | DIASTOLIC BLOOD PRESSURE: 60 MMHG | OXYGEN SATURATION: 98 % | BODY MASS INDEX: 37.36 KG/M2

## 2021-05-11 DIAGNOSIS — M25.532 LEFT WRIST PAIN: ICD-10-CM

## 2021-05-11 DIAGNOSIS — M25.532 LEFT WRIST PAIN: Primary | ICD-10-CM

## 2021-05-11 PROCEDURE — 73110 X-RAY EXAM OF WRIST: CPT | Performed by: FAMILY MEDICINE

## 2021-05-11 PROCEDURE — 99214 OFFICE O/P EST MOD 30 MIN: CPT | Performed by: FAMILY MEDICINE

## 2021-05-11 NOTE — PROGRESS NOTES
Elsa Cuello is a 82year old female. Patient presents with:  Pain: and swelling in left hand      HPI:   Two weeks ago she was pouring water in a coffee pot with left hand. Got bad pain in left hand. Went away in a day or two.  Was hard to straighten out 124/60  04/01/21 : 122/60  03/18/21 : 124/60  12/07/20 : 120/62  08/14/20 : 130/70      Wt Readings from Last 6 Encounters:  05/11/21 : 203 lb (92.1 kg)  04/26/21 : 207 lb (93.9 kg)  04/01/21 : 202 lb (91.6 kg)  03/18/21 : 207 lb (93.9 kg)  12/07/20 : 205

## 2021-06-03 ENCOUNTER — TELEPHONE (OUTPATIENT)
Dept: FAMILY MEDICINE CLINIC | Facility: CLINIC | Age: 86
End: 2021-06-03

## 2021-06-03 NOTE — TELEPHONE ENCOUNTER
[facility] wanting to clarify if patient has allergy to rosuvastatin or if ok to dispense.     Per epic, patient has allergy to atorvastatin - itching  Per our prescription history patient has been on rosuvastatin since 12-10-19    Form placed

## 2021-06-26 ENCOUNTER — MED REC SCAN ONLY (OUTPATIENT)
Dept: FAMILY MEDICINE CLINIC | Facility: CLINIC | Age: 86
End: 2021-06-26

## 2021-07-26 DIAGNOSIS — K21.9 GASTROESOPHAGEAL REFLUX DISEASE: ICD-10-CM

## 2021-07-26 RX ORDER — OMEPRAZOLE 40 MG/1
40 CAPSULE, DELAYED RELEASE ORAL DAILY
Qty: 90 CAPSULE | Refills: 3 | Status: SHIPPED | OUTPATIENT
Start: 2021-07-26 | End: 2022-01-16

## 2021-07-26 NOTE — TELEPHONE ENCOUNTER
PT CALLED AND ADV NEEDS REFILL OF     OMEPRAZOLE 40 MG Oral Capsule Delayed Release    [facility]       THANK YOU

## 2021-08-05 RX ORDER — APIXABAN 5 MG/1
TABLET, FILM COATED ORAL
Qty: 180 TABLET | Refills: 3 | Status: ON HOLD | OUTPATIENT
Start: 2021-08-05 | End: 2022-02-03

## 2021-10-05 ENCOUNTER — TELEPHONE (OUTPATIENT)
Dept: FAMILY MEDICINE CLINIC | Facility: CLINIC | Age: 86
End: 2021-10-05

## 2021-10-05 DIAGNOSIS — Z12.31 BREAST CANCER SCREENING BY MAMMOGRAM: Primary | ICD-10-CM

## 2021-10-05 NOTE — TELEPHONE ENCOUNTER
Per patient, no concerns. Wants a screening. Per KE, place pended mammogram order  Order placed.     Call transferred to  to schedule natan

## 2021-10-05 NOTE — TELEPHONE ENCOUNTER
Can you call her and see if there is a concern? Does she feel a mass? Or just wanting it for screening purposes?

## 2021-10-05 NOTE — TELEPHONE ENCOUNTER
Pt called would like to have a mammogram     Please place order     Pt call back Jaye Mayfield    Thank you

## 2021-10-19 ENCOUNTER — HOSPITAL ENCOUNTER (OUTPATIENT)
Dept: MAMMOGRAPHY | Age: 86
Discharge: HOME | End: 2021-10-19
Attending: FAMILY MEDICINE
Payer: MEDICARE

## 2021-10-19 DIAGNOSIS — Z12.31 BREAST CANCER SCREENING BY MAMMOGRAM: ICD-10-CM

## 2021-10-19 PROCEDURE — 77067 SCR MAMMO BI INCL CAD: CPT | Performed by: FAMILY MEDICINE

## 2021-11-08 ENCOUNTER — TELEPHONE (OUTPATIENT)
Dept: FAMILY MEDICINE CLINIC | Facility: CLINIC | Age: 86
End: 2021-11-08

## 2021-11-09 ENCOUNTER — MED REC SCAN ONLY (OUTPATIENT)
Dept: FAMILY MEDICINE CLINIC | Facility: CLINIC | Age: 86
End: 2021-11-09

## 2021-11-09 NOTE — TELEPHONE ENCOUNTER
Prior Authorization received from [employer] for Eliquis 5 mg TABLET--approved from 10/9/21 through 11/8/22. Case ID [id number]  Sent for scanning.

## 2021-11-15 ENCOUNTER — TELEPHONE (OUTPATIENT)
Dept: FAMILY MEDICINE CLINIC | Facility: CLINIC | Age: 86
End: 2021-11-15

## 2021-11-15 NOTE — TELEPHONE ENCOUNTER
Should not need orders placed for additional views. Will be able to call to schedule. Hold to confirm patient scheduled.

## 2021-11-15 NOTE — TELEPHONE ENCOUNTER
PT CALLED AND WANTED TO KNOW IF WE RECEIVED OTHER MAMMOGRAM FROM [facility]. RECEIVED AND VIEWED--LOOKS LIKE TEST WAS  REVIEWED AND NEEDS ADDITIONAL VIEWS.     PLEASE PLACE ORDER     PROVIDED PT WITH [phone] ULISES TO CALL AND SCHEDULE O

## 2021-11-22 NOTE — TELEPHONE ENCOUNTER
Additional views not scheduled at this time. Called and spoke with patient who states she is aware additional mammogram views recommended but states she has a lot going on right now with her  being on hospice.  States she is going to put this on h

## 2021-12-09 ENCOUNTER — HOSPITAL ENCOUNTER (OUTPATIENT)
Dept: MAMMOGRAPHY | Facility: HOSPITAL | Age: 86
Discharge: HOME | End: 2021-12-09
Attending: FAMILY MEDICINE
Payer: MEDICARE

## 2021-12-09 DIAGNOSIS — R92.2 INCONCLUSIVE MAMMOGRAM: ICD-10-CM

## 2021-12-09 PROCEDURE — 77065 DX MAMMO INCL CAD UNI: CPT | Performed by: FAMILY MEDICINE

## 2021-12-09 PROCEDURE — 77061 BREAST TOMOSYNTHESIS UNI: CPT | Performed by: FAMILY MEDICINE

## 2021-12-09 PROCEDURE — 76642 ULTRASOUND BREAST LIMITED: CPT | Performed by: FAMILY MEDICINE

## 2021-12-09 NOTE — IMAGING NOTE
This Breast Care RN assisted Dr. Ritu Cheek with recommendation for a right breast 1 site ultrasound guided biopsy for mass. Procedure reviewed and all questions answered. Emotional and educational support given.    On the day of the biopsy, pt instructed to t

## 2021-12-11 ENCOUNTER — TELEPHONE (OUTPATIENT)
Dept: FAMILY MEDICINE CLINIC | Facility: CLINIC | Age: 86
End: 2021-12-11

## 2021-12-11 DIAGNOSIS — R92.8 ABNORMAL MAMMOGRAM: Primary | ICD-10-CM

## 2021-12-11 NOTE — TELEPHONE ENCOUNTER
Pt inquiring - if our office received results/notes from [facility] - had \"thermography\" done - 6-8 years ago - pt advised will investigate and follow-up on Monday. She v/u.

## 2021-12-11 NOTE — TELEPHONE ENCOUNTER
----- Message from Alessandra Martin MD sent at 12/10/2021  7:12 AM CST -----  Please let patient or caregiver know or leave message that:   Her f/u R breast images continue to reveal a worrisome spot.  Radiology has recommended a biopsy to further investigate

## 2021-12-11 NOTE — TELEPHONE ENCOUNTER
Patient advised of Doctor's note below. Patient verbalized understanding and stated she was made aware at time of visit. US breast biopsy order pending, please review.  Thank you

## 2021-12-13 NOTE — TELEPHONE ENCOUNTER
See 11/18/21 radiology result scanned under media    Left message on voicemail/answering machine for patient to call office

## 2021-12-14 ENCOUNTER — TELEPHONE (OUTPATIENT)
Dept: FAMILY MEDICINE CLINIC | Facility: CLINIC | Age: 86
End: 2021-12-14

## 2021-12-14 NOTE — TELEPHONE ENCOUNTER
Patient notified and verbalized understanding. Patient also advised her  passed away 12/10. Offered condolences to patient. Patient states her son has been staying with her and she has another son close by.   Patient states there is nothing s

## 2021-12-14 NOTE — TELEPHONE ENCOUNTER
Future Appointments   Date Time Provider Jaye Tripathi   12/15/2021  3:45 PM PATRICE Mnacera [facility] Grace Montemayor

## 2021-12-14 NOTE — TELEPHONE ENCOUNTER
Patient call requesting an x-ray appt for  her arm. Left arm has been hurting.     Please advise # [phone]

## 2021-12-15 ENCOUNTER — HOSPITAL ENCOUNTER (OUTPATIENT)
Dept: GENERAL RADIOLOGY | Age: 86
Discharge: HOME | End: 2021-12-15
Attending: NURSE PRACTITIONER
Payer: MEDICARE

## 2021-12-15 ENCOUNTER — OFFICE VISIT (OUTPATIENT)
Dept: FAMILY MEDICINE CLINIC | Facility: CLINIC | Age: 86
End: 2021-12-15
Payer: MEDICARE

## 2021-12-15 VITALS
SYSTOLIC BLOOD PRESSURE: 130 MMHG | BODY MASS INDEX: 37 KG/M2 | DIASTOLIC BLOOD PRESSURE: 66 MMHG | WEIGHT: 202 LBS | OXYGEN SATURATION: 98 % | TEMPERATURE: 98 F | HEART RATE: 62 BPM

## 2021-12-15 DIAGNOSIS — M25.512 ACUTE PAIN OF LEFT SHOULDER: Primary | ICD-10-CM

## 2021-12-15 DIAGNOSIS — M25.512 ACUTE PAIN OF LEFT SHOULDER: ICD-10-CM

## 2021-12-15 PROCEDURE — 99213 OFFICE O/P EST LOW 20 MIN: CPT | Performed by: NURSE PRACTITIONER

## 2021-12-15 PROCEDURE — 73030 X-RAY EXAM OF SHOULDER: CPT | Performed by: NURSE PRACTITIONER

## 2021-12-26 NOTE — PROGRESS NOTES
Subjective:   Patient ID: Kristen Del Valle is a 82year old female. Patient presents to clinic today for evaluation of left shoulder pain. States symptoms have been going on for about a week now. She did receive a COVID booster in 10/21.   Also feels like Normal rate and regular rhythm. Heart sounds: Normal heart sounds. Pulmonary:      Effort: Pulmonary effort is normal.      Breath sounds: Normal breath sounds. Abdominal:      General: Bowel sounds are normal.      Palpations: Abdomen is soft.

## 2021-12-30 ENCOUNTER — HOSPITAL ENCOUNTER (OUTPATIENT)
Dept: MAMMOGRAPHY | Age: 86
Discharge: HOME | End: 2021-12-30
Attending: FAMILY MEDICINE
Payer: MEDICARE

## 2021-12-30 ENCOUNTER — HOSPITAL ENCOUNTER (OUTPATIENT)
Dept: ULTRASOUND IMAGING | Age: 86
Discharge: HOME | End: 2021-12-30
Attending: FAMILY MEDICINE
Payer: MEDICARE

## 2021-12-30 DIAGNOSIS — R92.8 ABNORMAL MAMMOGRAM: ICD-10-CM

## 2021-12-30 PROCEDURE — 88360 TUMOR IMMUNOHISTOCHEM/MANUAL: CPT | Performed by: FAMILY MEDICINE

## 2021-12-30 PROCEDURE — 77065 DX MAMMO INCL CAD UNI: CPT | Performed by: FAMILY MEDICINE

## 2021-12-30 PROCEDURE — 88305 TISSUE EXAM BY PATHOLOGIST: CPT | Performed by: FAMILY MEDICINE

## 2021-12-30 PROCEDURE — 19083 BX BREAST 1ST LESION US IMAG: CPT | Performed by: FAMILY MEDICINE

## 2021-12-30 NOTE — IMAGING NOTE
Pt arrived with son. Procedure explained throughout and all questions answered. Consent and discharge paperwork signed by Que Patient. Right breast positioned. Assisted Dr. Danielle Rice with [address] [facility] guided biopsy.  Pt tolerated well.  Pressure held on biopsy site fo

## 2022-01-04 ENCOUNTER — TELEPHONE (OUTPATIENT)
Dept: MAMMOGRAPHY | Facility: HOSPITAL | Age: 87
End: 2022-01-04

## 2022-01-04 NOTE — TELEPHONE ENCOUNTER
Telephoned [facility] and name,  verified with patient. Notified [facility] of right breast positive for IDC biopsy result. Concordance verified by radiologist, Dr. Shala Erwin. [facility] reports biopsy site is healing well.   Radiologist recommends

## 2022-01-06 ENCOUNTER — NURSE NAVIGATOR ENCOUNTER (OUTPATIENT)
Dept: HEMATOLOGY/ONCOLOGY | Facility: HOSPITAL | Age: 87
End: 2022-01-06

## 2022-01-06 NOTE — PROGRESS NOTES
Phoned and we discussed newly diagnosed breast cancer. Introduced myself as one of the breast nurse navigators and my partner, Shravanbrigitte Le, as the other navigator, and explained the role of the breast nurse navigators.  Explained the role of the physici

## 2022-01-11 ENCOUNTER — OFFICE VISIT (OUTPATIENT)
Dept: SURGERY | Facility: CLINIC | Age: 87
End: 2022-01-11
Payer: MEDICARE

## 2022-01-11 ENCOUNTER — NURSE NAVIGATOR ENCOUNTER (OUTPATIENT)
Dept: HEMATOLOGY/ONCOLOGY | Facility: HOSPITAL | Age: 87
End: 2022-01-11

## 2022-01-11 VITALS
RESPIRATION RATE: 18 BRPM | BODY MASS INDEX: 36.92 KG/M2 | OXYGEN SATURATION: 97 % | HEIGHT: 62 IN | TEMPERATURE: 97 F | WEIGHT: 200.63 LBS | SYSTOLIC BLOOD PRESSURE: 152 MMHG | HEART RATE: 55 BPM | DIASTOLIC BLOOD PRESSURE: 75 MMHG

## 2022-01-11 DIAGNOSIS — C50.511 MALIGNANT NEOPLASM OF LOWER-OUTER QUADRANT OF RIGHT BREAST OF FEMALE, ESTROGEN RECEPTOR POSITIVE (HCC): Primary | ICD-10-CM

## 2022-01-11 DIAGNOSIS — Z17.0 MALIGNANT NEOPLASM OF LOWER-OUTER QUADRANT OF RIGHT BREAST OF FEMALE, ESTROGEN RECEPTOR POSITIVE (HCC): Primary | ICD-10-CM

## 2022-01-11 PROCEDURE — 99205 OFFICE O/P NEW HI 60 MIN: CPT | Performed by: SURGERY

## 2022-01-11 NOTE — PROGRESS NOTES
Met with patient and her son in clinic. Introduced myself as the breast navigator nurse and explained the role of the breast nurse navigator and coordination of care.  Explained the role of all of the physicians involved in her care including the surgeonjosue

## 2022-01-11 NOTE — PROGRESS NOTES
Breast Surgery New Patient Consultation    This is the first visit for this 82year old woman, referred by Dr. Alfred Jules, who presents for evaluation of breast cancer.     History of Present Illness:   Ms. Kiersten Aly is a 82year old woman who presents wit hormone replacement therapy. She denies any history of oral contraceptive use. She denies infertility treatment to achieve pregnancy.     Medications:    ELIQUIS 5 MG Oral Tab, TAKE 1 TABLET TWICE A DAY, Disp: 180 tablet, Rfl: 3  Omeprazole 40 MG Oral C phlegm, hemoptysis, pleurisy/chest pain, pneumonia, asthma, wheezing, difficulty in breathing with exertion, emphysema, chronic bronchitis, shortness of breath or abnormal sound when breathing. Cardiovascular:   There is no history of chest pain, chest Allergic/Immunologic:  There is no history of hives, hay fever, angioedema or anaphylaxis.     /75 (BP Location: Right arm, Patient Position: Sitting, Cuff Size: large)   Pulse 55   Temp 97.2 °F (36.2 °C)   Resp 18   Ht 1.575 m (5' 2\")   Wt 91 kg cyanosis or edema. Impression:   Ms. Endy Sheffield is a 82year old woman presents with image detected right breast cancer, clinical stage T1 NX MX.     Discussion and Plan:  I had a discussion with the Patient regarding her breast exam. On exam today I explained to the patient and her family and she understood and agreed to the proposed plan. She was given ample opportunity for questions and those questions were answered to her satisfaction.  She has been  encouraged to contact the office with any questio

## 2022-01-11 NOTE — PATIENT INSTRUCTIONS
Dr. Manas Locke  Tel: [phone]  Fax: [facility] Nena BARRAZA, [address] [place] [address]  [phone]     Surgery/Procedure: Right breast wire localized lumpectomy.      Anesthesia:   MAC  Surgery Length:   1 hour CPT:  193 begin making calls after 2pm, if you are not contacted by 4pm, please call the surgeon's office listed above. 11. Do not take any blood thinners at least one week prior to the procedure/surgery.  This includes aspirin, baby aspirin, Ibuprofen products, her

## 2022-01-13 ENCOUNTER — TELEPHONE (OUTPATIENT)
Dept: FAMILY MEDICINE CLINIC | Facility: CLINIC | Age: 87
End: 2022-01-13

## 2022-01-13 NOTE — TELEPHONE ENCOUNTER
preop orders received    Will need H&P, BMP and EKG, orders placed in blue book.     Hold to schedule preop appt once surgery scheduled

## 2022-01-15 DIAGNOSIS — K21.9 GASTROESOPHAGEAL REFLUX DISEASE: ICD-10-CM

## 2022-01-15 NOTE — TELEPHONE ENCOUNTER
Last refilled on 7/26/21 for # 90 with 3 refills  Last OV 12/15/21  No future appointments. Thank you.

## 2022-01-15 NOTE — TELEPHONE ENCOUNTER
Omeprazole 40 MG Oral Capsule Delayed Release    Pt would like refill sent to   [phone], [phone]

## 2022-01-16 RX ORDER — OMEPRAZOLE 40 MG/1
40 CAPSULE, DELAYED RELEASE ORAL DAILY
Qty: 90 CAPSULE | Refills: 0 | Status: SHIPPED | OUTPATIENT
Start: 2022-01-16

## 2022-01-27 ENCOUNTER — TELEPHONE (OUTPATIENT)
Dept: SURGERY | Facility: CLINIC | Age: 87
End: 2022-01-27

## 2022-01-27 DIAGNOSIS — I25.10 ATHEROSCLEROSIS OF NATIVE CORONARY ARTERY OF NATIVE HEART WITHOUT ANGINA PECTORIS: ICD-10-CM

## 2022-01-27 DIAGNOSIS — I10 ESSENTIAL HYPERTENSION, BENIGN: ICD-10-CM

## 2022-01-27 DIAGNOSIS — Z17.0 MALIGNANT NEOPLASM OF LOWER-OUTER QUADRANT OF RIGHT BREAST OF FEMALE, ESTROGEN RECEPTOR POSITIVE (HCC): Primary | ICD-10-CM

## 2022-01-27 DIAGNOSIS — C50.511 MALIGNANT NEOPLASM OF LOWER-OUTER QUADRANT OF RIGHT BREAST OF FEMALE, ESTROGEN RECEPTOR POSITIVE (HCC): Primary | ICD-10-CM

## 2022-01-27 DIAGNOSIS — I48.91 ATRIAL FIBRILLATION WITH TACHYCARDIC VENTRICULAR RATE (HCC): ICD-10-CM

## 2022-01-27 NOTE — TELEPHONE ENCOUNTER
Spoke with patient who states she is waiting to hear back from Dr Ember Mcconnell office. Was told they will contact her in 2-3 weeks to schedule procedure. Advised patient to let our office know when she is scheduled and we will get her into see Dr Gail Muniz.   Caden Singh

## 2022-01-27 NOTE — TELEPHONE ENCOUNTER
Patient called back. States her surgery is 2/3/22    preop scheduled  Future Appointments   Date Time Provider Jaye Tripathi   2/1/2022 11:15 AM DO Kelsi Steward [facility] Siddharth Lara       States she was advised to stop eliquis 1 week prior.   States s

## 2022-01-27 NOTE — TELEPHONE ENCOUNTER
Calling pt in regards to scheduling surgery. Informed pt that I have 02/03/2022 available at [facility] with Dr. Javier Richardson. Pt verbalized understanding and in agreement with date and location. All questions answered.    Encouraged pt to call or MyChart

## 2022-01-27 NOTE — TELEPHONE ENCOUNTER
Patient states she received notification her medications need refilled. States she is not sure which one.     Potassium, metoprolol, furosemide and rosuvastatin last refilled March 2021    Last OV 12/15/21 (alfred)  Last labs :  3/18/21 CMP/Creat 0.80/ALT

## 2022-01-28 ENCOUNTER — TELEPHONE (OUTPATIENT)
Dept: MAMMOGRAPHY | Facility: HOSPITAL | Age: 87
End: 2022-01-28

## 2022-01-28 RX ORDER — SODIUM CHLORIDE, SODIUM LACTATE, POTASSIUM CHLORIDE, CALCIUM CHLORIDE 600; 310; 30; 20 MG/100ML; MG/100ML; MG/100ML; MG/100ML
INJECTION, SOLUTION INTRAVENOUS CONTINUOUS
Status: CANCELLED | OUTPATIENT
Start: 2022-01-28

## 2022-01-28 RX ORDER — ROSUVASTATIN CALCIUM 20 MG/1
20 TABLET, COATED ORAL NIGHTLY
Qty: 90 TABLET | Refills: 3 | Status: SHIPPED | OUTPATIENT
Start: 2022-01-28

## 2022-01-28 RX ORDER — METOPROLOL SUCCINATE 50 MG/1
50 TABLET, EXTENDED RELEASE ORAL DAILY
Qty: 90 TABLET | Refills: 3 | Status: SHIPPED | OUTPATIENT
Start: 2022-01-28

## 2022-01-28 RX ORDER — FUROSEMIDE 20 MG/1
20 TABLET ORAL DAILY
Qty: 90 TABLET | Refills: 3 | Status: SHIPPED | OUTPATIENT
Start: 2022-01-28

## 2022-01-28 RX ORDER — POTASSIUM CHLORIDE 20 MEQ/1
20 TABLET, EXTENDED RELEASE ORAL DAILY
Qty: 90 TABLET | Refills: 3 | Status: SHIPPED | OUTPATIENT
Start: 2022-01-28

## 2022-01-28 NOTE — TELEPHONE ENCOUNTER
Phoned Tae Logan regarding needle localization process of breast for lumpectomy scheduled for 2-3-22 with Dr. Malaika Clark. Procedure explained and all questions answered. Pt to be transported via W[facility]C [facility] to [facility] in [facility].  Pt verbalized under

## 2022-01-31 ENCOUNTER — TELEPHONE (OUTPATIENT)
Dept: FAMILY MEDICINE CLINIC | Facility: CLINIC | Age: 87
End: 2022-01-31

## 2022-01-31 NOTE — TELEPHONE ENCOUNTER
Spoke with pharmacist at [facility] who states they received a newly reported allergy to Atorvastatin and want to make sure ok to fill rosuvastatin. Advised pharmacist ok to fill rosuvastatin.    Has been filling since at least December 2019 without

## 2022-01-31 NOTE — TELEPHONE ENCOUNTER
Yash Reddy from [address] calling received refill request but states was just reported that pt had an allergy to this medication and wants to double check       Phone # [phone]    Ref # [id number]    rosuvastatin 20 MG Oral Tab    Thank you

## 2022-02-01 ENCOUNTER — OFFICE VISIT (OUTPATIENT)
Dept: FAMILY MEDICINE CLINIC | Facility: CLINIC | Age: 87
End: 2022-02-01
Payer: MEDICARE

## 2022-02-01 ENCOUNTER — TELEPHONE (OUTPATIENT)
Dept: CARDIOLOGY | Age: 87
End: 2022-02-01

## 2022-02-01 ENCOUNTER — TELEPHONE (OUTPATIENT)
Dept: FAMILY MEDICINE CLINIC | Facility: CLINIC | Age: 87
End: 2022-02-01

## 2022-02-01 VITALS
BODY MASS INDEX: 37.17 KG/M2 | RESPIRATION RATE: 18 BRPM | OXYGEN SATURATION: 98 % | DIASTOLIC BLOOD PRESSURE: 70 MMHG | HEART RATE: 58 BPM | SYSTOLIC BLOOD PRESSURE: 130 MMHG | TEMPERATURE: 97 F | HEIGHT: 62 IN | WEIGHT: 202 LBS

## 2022-02-01 DIAGNOSIS — C50.911 MALIGNANT NEOPLASM OF RIGHT FEMALE BREAST, UNSPECIFIED ESTROGEN RECEPTOR STATUS, UNSPECIFIED SITE OF BREAST (HCC): ICD-10-CM

## 2022-02-01 DIAGNOSIS — K21.9 GASTROESOPHAGEAL REFLUX DISEASE WITHOUT ESOPHAGITIS: ICD-10-CM

## 2022-02-01 DIAGNOSIS — I25.10 CORONARY ARTERY DISEASE INVOLVING NATIVE CORONARY ARTERY OF NATIVE HEART WITHOUT ANGINA PECTORIS: ICD-10-CM

## 2022-02-01 DIAGNOSIS — I10 ESSENTIAL HYPERTENSION, BENIGN: ICD-10-CM

## 2022-02-01 DIAGNOSIS — Z01.818 PRE-OP EXAMINATION: Primary | ICD-10-CM

## 2022-02-01 DIAGNOSIS — I48.0 PAROXYSMAL ATRIAL FIBRILLATION (HCC): ICD-10-CM

## 2022-02-01 LAB
ANION GAP SERPL CALC-SCNC: 1 MMOL/L (ref 0–18)
BASOPHILS # BLD AUTO: 0.06 X10(3) UL (ref 0–0.2)
BASOPHILS NFR BLD AUTO: 1.1 %
CALCIUM BLD-MCNC: 9.5 MG/DL (ref 8.5–10.1)
CHLORIDE SERPL-SCNC: 106 MMOL/L (ref 98–112)
CHOLEST SERPL-MCNC: 142 MG/DL (ref ?–200)
CO2 SERPL-SCNC: 34 MMOL/L (ref 21–32)
CREAT BLD-MCNC: 0.83 MG/DL
EOSINOPHIL # BLD AUTO: 0.09 X10(3) UL (ref 0–0.7)
EOSINOPHIL NFR BLD AUTO: 1.6 %
ERYTHROCYTE [DISTWIDTH] IN BLOOD BY AUTOMATED COUNT: 13.2 %
FASTING PATIENT LIPID ANSWER: NO
FASTING STATUS PATIENT QL REPORTED: NO
GLUCOSE BLD-MCNC: 91 MG/DL (ref 70–99)
HCT VFR BLD AUTO: 43.5 %
HDLC SERPL-MCNC: 67 MG/DL (ref 40–59)
HGB BLD-MCNC: 14.1 G/DL
IMM GRANULOCYTES # BLD AUTO: 0 X10(3) UL (ref 0–1)
IMM GRANULOCYTES NFR BLD: 0 %
LDLC SERPL CALC-MCNC: 55 MG/DL (ref ?–100)
LYMPHOCYTES # BLD AUTO: 1.32 X10(3) UL (ref 1–4)
LYMPHOCYTES NFR BLD AUTO: 23.6 %
MCH RBC QN AUTO: 30.5 PG (ref 26–34)
MCHC RBC AUTO-ENTMCNC: 32.4 G/DL (ref 31–37)
MCV RBC AUTO: 94 FL
MONOCYTES # BLD AUTO: 0.55 X10(3) UL (ref 0.1–1)
MONOCYTES NFR BLD AUTO: 9.8 %
NEUTROPHILS # BLD AUTO: 3.57 X10 (3) UL (ref 1.5–7.7)
NEUTROPHILS # BLD AUTO: 3.57 X10(3) UL (ref 1.5–7.7)
NEUTROPHILS NFR BLD AUTO: 63.9 %
NONHDLC SERPL-MCNC: 75 MG/DL (ref ?–130)
OSMOLALITY SERPL CALC.SUM OF ELEC: 293 MOSM/KG (ref 275–295)
PLATELET # BLD AUTO: 156 10(3)UL (ref 150–450)
POTASSIUM SERPL-SCNC: 4.9 MMOL/L (ref 3.5–5.1)
RBC # BLD AUTO: 4.63 X10(6)UL
SODIUM SERPL-SCNC: 141 MMOL/L (ref 136–145)
TRIGL SERPL-MCNC: 113 MG/DL (ref 30–149)
VLDLC SERPL CALC-MCNC: 16 MG/DL (ref 0–30)
WBC # BLD AUTO: 5.6 X10(3) UL (ref 4–11)

## 2022-02-01 PROCEDURE — 85025 COMPLETE CBC W/AUTO DIFF WBC: CPT | Performed by: FAMILY MEDICINE

## 2022-02-01 PROCEDURE — 93000 ELECTROCARDIOGRAM COMPLETE: CPT | Performed by: FAMILY MEDICINE

## 2022-02-01 PROCEDURE — 3078F DIAST BP <80 MM HG: CPT | Performed by: FAMILY MEDICINE

## 2022-02-01 PROCEDURE — 3008F BODY MASS INDEX DOCD: CPT | Performed by: FAMILY MEDICINE

## 2022-02-01 PROCEDURE — 80048 BASIC METABOLIC PNL TOTAL CA: CPT | Performed by: FAMILY MEDICINE

## 2022-02-01 PROCEDURE — 99214 OFFICE O/P EST MOD 30 MIN: CPT | Performed by: FAMILY MEDICINE

## 2022-02-01 PROCEDURE — 80061 LIPID PANEL: CPT | Performed by: FAMILY MEDICINE

## 2022-02-01 PROCEDURE — 3075F SYST BP GE 130 - 139MM HG: CPT | Performed by: FAMILY MEDICINE

## 2022-02-01 NOTE — TELEPHONE ENCOUNTER
Admission Status; Secondary Review Determination    Under the authority of the Utilization Management Committee, the utilization review process indicated a secondary review on the above patient. The review outcome is based on review of the medical records, discussions with staff, and applying clinical experience noted on the date of the review.    (x) Inpatient Status Appropriate - This patient's medical care is consistent with medical management for inpatient care and reasonable inpatient medical practice.    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  Patient now found to have significant focal hematoma of 11 cm in the anterior pelvis as well as possible retained products in the uterus.  Patient status post a D&C .  Patient continues to have significant dysuria and abdominal pain to the point of requiring acute hospitalization.  Patient will be managed for possible UTI as well as needing symptomatic treatment of patient's hematoma with plans for interventional radiology placing catheter as well as intracavitary alteplase twice daily with clinically monitoring.  The severity of symptoms and the duration of treatment required are appropriate for inpatient management.    At the time of admission with the information available to the attending physician more than 2 nights Hospital complex care was anticipated, based on patient risk of adverse outcome if treated as outpatient and complex care required. Inpatient admission is appropriate based on the Medicare guidelines.    This document was produced using voice recognition software    The information on this document is developed by the utilization review team in order for the business office to ensure  Attempted to contact [facility] 2 times. Each time got to a representative who states patient account is handled by a dedicated team. States they are unable to provide a direct number, will have to transfer. Each time call goes back to the same automated line to go through the same process. Faxed request to [facility] explaining patient and our office has been trying to contact them regarding the bill. Asked for someone to call office back. compliance. This only denotes the appropriateness of proper admission status and does not reflect the quality of care rendered.    The definitions of Inpatient Status and Observation Status used in making the determination above are those provided in the CMS Coverage Manual, Chapter 1 and Chapter 6, section 70.4.    Sincerely,    Reji Goyal MD  Utilization Review  Physician Advisor  [facility].

## 2022-02-01 NOTE — TELEPHONE ENCOUNTER
Received: Today  DO Louis Kruse Nurse  Fax along with labs and EKG. Also, [facility] told her she has a $700 bill. She is wondering what that is, is it the eliquis? Is there an alternative we can use. Can you call [facility] and see what that is?

## 2022-02-02 ENCOUNTER — NURSE NAVIGATOR ENCOUNTER (OUTPATIENT)
Dept: HEMATOLOGY/ONCOLOGY | Facility: HOSPITAL | Age: 87
End: 2022-02-02

## 2022-02-02 NOTE — PROGRESS NOTES
Called patient back in regards to her VM about if her surgery for tomorrow was still scheduled due to the inclement weather of snow and ice. I stated that I had not heard it was canceled and to proceed with her surgery as scheduled. Patient thanked me for the phone call and pt was provided with breast nurse navigator contact information and was encouraged to phone with any other questions or concerns.

## 2022-02-03 ENCOUNTER — HOSPITAL ENCOUNTER (OUTPATIENT)
Dept: MAMMOGRAPHY | Facility: HOSPITAL | Age: 87
Discharge: HOME | End: 2022-02-03
Attending: SURGERY
Payer: MEDICARE

## 2022-02-03 ENCOUNTER — ANESTHESIA EVENT (OUTPATIENT)
Dept: SURGERY | Facility: HOSPITAL | Age: 87
End: 2022-02-03
Payer: MEDICARE

## 2022-02-03 ENCOUNTER — ANESTHESIA (OUTPATIENT)
Dept: SURGERY | Facility: HOSPITAL | Age: 87
End: 2022-02-03
Payer: MEDICARE

## 2022-02-03 ENCOUNTER — APPOINTMENT (OUTPATIENT)
Dept: MAMMOGRAPHY | Facility: HOSPITAL | Age: 87
End: 2022-02-03
Attending: SURGERY
Payer: MEDICARE

## 2022-02-03 ENCOUNTER — HOSPITAL ENCOUNTER (OUTPATIENT)
Facility: HOSPITAL | Age: 87
Setting detail: HOSPITAL OUTPATIENT SURGERY
Discharge: HOME | End: 2022-02-03
Attending: SURGERY | Admitting: SURGERY
Payer: MEDICARE

## 2022-02-03 VITALS
SYSTOLIC BLOOD PRESSURE: 129 MMHG | OXYGEN SATURATION: 95 % | HEART RATE: 51 BPM | HEIGHT: 62 IN | DIASTOLIC BLOOD PRESSURE: 55 MMHG | TEMPERATURE: 98 F | WEIGHT: 205.5 LBS | RESPIRATION RATE: 16 BRPM | BODY MASS INDEX: 37.81 KG/M2

## 2022-02-03 DIAGNOSIS — Z17.0 MALIGNANT NEOPLASM OF LOWER-OUTER QUADRANT OF RIGHT BREAST OF FEMALE, ESTROGEN RECEPTOR POSITIVE (HCC): ICD-10-CM

## 2022-02-03 DIAGNOSIS — C50.911 MALIGNANT NEOPLASM OF RIGHT BREAST (HCC): Primary | ICD-10-CM

## 2022-02-03 DIAGNOSIS — C50.511 MALIGNANT NEOPLASM OF LOWER-OUTER QUADRANT OF RIGHT BREAST OF FEMALE, ESTROGEN RECEPTOR POSITIVE (HCC): ICD-10-CM

## 2022-02-03 LAB — SARS-COV-2 RNA RESP QL NAA+PROBE: NOT DETECTED

## 2022-02-03 PROCEDURE — 19285 PERQ DEV BREAST 1ST US IMAG: CPT | Performed by: SURGERY

## 2022-02-03 PROCEDURE — 0HBT0ZZ EXCISION OF RIGHT BREAST, OPEN APPROACH: ICD-10-PCS | Performed by: SURGERY

## 2022-02-03 PROCEDURE — 88307 TISSUE EXAM BY PATHOLOGIST: CPT | Performed by: SURGERY

## 2022-02-03 PROCEDURE — 77065 DX MAMMO INCL CAD UNI: CPT | Performed by: SURGERY

## 2022-02-03 PROCEDURE — 88342 IMHCHEM/IMCYTCHM 1ST ANTB: CPT | Performed by: SURGERY

## 2022-02-03 PROCEDURE — 88341 IMHCHEM/IMCYTCHM EA ADD ANTB: CPT | Performed by: SURGERY

## 2022-02-03 PROCEDURE — 88305 TISSUE EXAM BY PATHOLOGIST: CPT | Performed by: SURGERY

## 2022-02-03 PROCEDURE — 76098 X-RAY EXAM SURGICAL SPECIMEN: CPT | Performed by: SURGERY

## 2022-02-03 RX ORDER — LIDOCAINE HYDROCHLORIDE AND EPINEPHRINE 10; 10 MG/ML; UG/ML
INJECTION, SOLUTION INFILTRATION; PERINEURAL AS NEEDED
Status: DISCONTINUED | OUTPATIENT
Start: 2022-02-03 | End: 2022-02-03

## 2022-02-03 RX ORDER — ONDANSETRON 2 MG/ML
4 INJECTION INTRAMUSCULAR; INTRAVENOUS AS NEEDED
Status: DISCONTINUED | OUTPATIENT
Start: 2022-02-03 | End: 2022-02-03

## 2022-02-03 RX ORDER — SODIUM CHLORIDE, SODIUM LACTATE, POTASSIUM CHLORIDE, CALCIUM CHLORIDE 600; 310; 30; 20 MG/100ML; MG/100ML; MG/100ML; MG/100ML
INJECTION, SOLUTION INTRAVENOUS CONTINUOUS
Status: DISCONTINUED | OUTPATIENT
Start: 2022-02-03 | End: 2022-02-03

## 2022-02-03 RX ORDER — HYDROCODONE BITARTRATE AND ACETAMINOPHEN 5; 325 MG/1; MG/1
2 TABLET ORAL AS NEEDED
Status: DISCONTINUED | OUTPATIENT
Start: 2022-02-03 | End: 2022-02-03

## 2022-02-03 RX ORDER — CEFAZOLIN SODIUM/WATER 2 G/20 ML
2 SYRINGE (ML) INTRAVENOUS ONCE
Status: COMPLETED | OUTPATIENT
Start: 2022-02-03 | End: 2022-02-03

## 2022-02-03 RX ORDER — KETAMINE HYDROCHLORIDE 50 MG/ML
INJECTION, SOLUTION, CONCENTRATE INTRAMUSCULAR; INTRAVENOUS AS NEEDED
Status: DISCONTINUED | OUTPATIENT
Start: 2022-02-03 | End: 2022-02-03 | Stop reason: SURG

## 2022-02-03 RX ORDER — DIAZEPAM 5 MG/1
5 TABLET ORAL AS NEEDED
Status: DISCONTINUED | OUTPATIENT
Start: 2022-02-03 | End: 2022-02-03 | Stop reason: HOSPADM

## 2022-02-03 RX ORDER — HYDROCODONE BITARTRATE AND ACETAMINOPHEN 5; 325 MG/1; MG/1
1 TABLET ORAL AS NEEDED
Status: DISCONTINUED | OUTPATIENT
Start: 2022-02-03 | End: 2022-02-03

## 2022-02-03 RX ORDER — ONDANSETRON 2 MG/ML
INJECTION INTRAMUSCULAR; INTRAVENOUS AS NEEDED
Status: DISCONTINUED | OUTPATIENT
Start: 2022-02-03 | End: 2022-02-03 | Stop reason: SURG

## 2022-02-03 RX ORDER — HYDROMORPHONE HYDROCHLORIDE 1 MG/ML
0.4 INJECTION, SOLUTION INTRAMUSCULAR; INTRAVENOUS; SUBCUTANEOUS EVERY 5 MIN PRN
Status: DISCONTINUED | OUTPATIENT
Start: 2022-02-03 | End: 2022-02-03

## 2022-02-03 RX ORDER — MIDAZOLAM HYDROCHLORIDE 1 MG/ML
INJECTION INTRAMUSCULAR; INTRAVENOUS AS NEEDED
Status: DISCONTINUED | OUTPATIENT
Start: 2022-02-03 | End: 2022-02-03 | Stop reason: SURG

## 2022-02-03 RX ORDER — ACETAMINOPHEN 500 MG
1000 TABLET ORAL ONCE
Status: DISCONTINUED | OUTPATIENT
Start: 2022-02-03 | End: 2022-02-03 | Stop reason: HOSPADM

## 2022-02-03 RX ORDER — NALOXONE HYDROCHLORIDE 0.4 MG/ML
80 INJECTION, SOLUTION INTRAMUSCULAR; INTRAVENOUS; SUBCUTANEOUS AS NEEDED
Status: DISCONTINUED | OUTPATIENT
Start: 2022-02-03 | End: 2022-02-03

## 2022-02-03 RX ORDER — BUPIVACAINE HYDROCHLORIDE 5 MG/ML
INJECTION, SOLUTION EPIDURAL; INTRACAUDAL AS NEEDED
Status: DISCONTINUED | OUTPATIENT
Start: 2022-02-03 | End: 2022-02-03

## 2022-02-03 RX ORDER — DEXAMETHASONE SODIUM PHOSPHATE 4 MG/ML
VIAL (ML) INJECTION AS NEEDED
Status: DISCONTINUED | OUTPATIENT
Start: 2022-02-03 | End: 2022-02-03 | Stop reason: SURG

## 2022-02-03 RX ADMIN — CEFAZOLIN SODIUM/WATER 2 G: 2 G/20 ML SYRINGE (ML) INTRAVENOUS at 10:08:00

## 2022-02-03 RX ADMIN — ONDANSETRON 4 MG: 2 INJECTION INTRAMUSCULAR; INTRAVENOUS at 10:06:00

## 2022-02-03 RX ADMIN — SODIUM CHLORIDE, SODIUM LACTATE, POTASSIUM CHLORIDE, CALCIUM CHLORIDE: 600; 310; 30; 20 INJECTION, SOLUTION INTRAVENOUS at 10:39:00

## 2022-02-03 RX ADMIN — KETAMINE HYDROCHLORIDE 12.5 MG: 50 INJECTION, SOLUTION, CONCENTRATE INTRAMUSCULAR; INTRAVENOUS at 10:06:00

## 2022-02-03 RX ADMIN — MIDAZOLAM HYDROCHLORIDE 1 MG: 1 INJECTION INTRAMUSCULAR; INTRAVENOUS at 10:06:00

## 2022-02-03 RX ADMIN — MIDAZOLAM HYDROCHLORIDE 1 MG: 1 INJECTION INTRAMUSCULAR; INTRAVENOUS at 10:01:00

## 2022-02-03 RX ADMIN — KETAMINE HYDROCHLORIDE 12.5 MG: 50 INJECTION, SOLUTION, CONCENTRATE INTRAMUSCULAR; INTRAVENOUS at 10:01:00

## 2022-02-03 RX ADMIN — DEXAMETHASONE SODIUM PHOSPHATE 8 MG: 4 MG/ML VIAL (ML) INJECTION at 10:06:00

## 2022-02-03 NOTE — BRIEF OP NOTE
Pre-Operative Diagnosis: Malignant neoplasm of lower-outer quadrant of right breast of female, estrogen receptor positive ([facility] [C50.511, Z17.0]     Post-Operative Diagnosis: * No post-op diagnosis entered *      Procedure Performed:    Right breast wire localized lumpectomy    Surgeon(s) and Role:     Naya Mahoney MD - Primary    Assistant(s):  Surgical Assistant.: Emily Tomas CSA     Surgical Findings: Clip in xray     Specimen: R lumpectomy, Margins x6     Estimated Blood Loss: Nakul Mathur MD  2/3/2022  10:54 AM

## 2022-02-03 NOTE — ANESTHESIA POSTPROCEDURE EVALUATION
Gene Ac Patient Status:  Hospital Outpatient Surgery   Age/Gender 82year old female MRN [id number]   [facility] SURGERY Attending Garo Wilson MD   Hosp Day # 0 PCP Luis Manuel Benitez DO       Anesthesia Post-op Note     Right breast wire localized lumpectomy    Procedure Summary     Date: 02/03/22 Room / Location: [address] [place] [address] OR 15 / 1404 [facility] OR    Anesthesia Start: 1001 Anesthesia Stop:     Procedure: Right breast wire localized lumpectomy (Right ) Diagnosis:       Malignant neoplasm of lower-outer quadrant of right breast of female, estrogen receptor positive ([address]      (Malignant neoplasm of lower-outer quadrant of right breast of female, estrogen receptor positive ([facility] Alvaro Gray, Z17.0])    Surgeons: Garo Wilson MD Anesthesiologist: Jimbo Lopez MD    Anesthesia Type: MAC ASA Status: 3          Anesthesia Type: MAC    Vitals Value Taken Time   /61 02/03/22 1047   Temp 98.1 02/03/22 1047   Pulse 51 02/03/22 1047   Resp 14 02/03/22 1047   SpO2 99 02/03/22 1047       Patient Location: Same Day Surgery    Anesthesia Type: MAC    Airway Patency: patent    Postop Pain Control: adequate    Mental Status: preanesthetic baseline    Nausea/Vomiting: none    Cardiopulmonary/Hydration status: stable euvolemic    Complications: no apparent anesthesia related complications    Postop vital signs: stable    Dental Exam: Unchanged from Preop    Patient to be discharged home when criteria met.

## 2022-02-03 NOTE — OPERATIVE REPORT
[address]    PATIENT'S NAME: [facility]   ATTENDING PHYSICIAN: Baxter Leiter Lauran Opitz, M.D. OPERATING PHYSICIAN: Octaviano Toure. Lauran Opitz, M.D. PATIENT ACCOUNT#:   [de-identified]    [facility] EDWP 10  MEDICAL RECORD #:   [id number]       YOB: 1935  ADMISSION DATE:       02/03/2022      OPERATION DATE:  02/03/2022    OPERATIVE REPORT    PREOPERATIVE DIAGNOSIS:  Malignant neoplasm of the right breast.  POSTOPERATIVE DIAGNOSIS:  Malignant neoplasm of the right breast.   PROCEDURE:  Right breast wire-localized lumpectomy with right breast specimen radiography. ASSISTANT:  Jovanni Ascencio CSA    ANESTHESIA:  Monitored anesthesia care and local.    ESTIMATED BLOOD LOSS:  5 mL. DRAINS:  None. COMPLICATIONS:  None. DISPOSITION:  Stable on transfer to the recovery room. INDICATIONS:  The patient is an 22-year-old female with an imaging-detected right breast cancer. She was found to have a suspicious mass and had biopsy confirmed diagnosis. We discussed local treatment options. She is interested in a breast-conserving approach. Given her age, comorbidities, and the low proliferation index of this favorable cancer in the context of a normal clinical and radiological axillary exam, no axillary interrogation was recommended for this cancer. She was counseled on the need to achieve free margins, the possibility of reexcision to achieve free margins, as well as the possible need for postoperative systemic and/or radiation therapy. Risks and possible complications were discussed with the patient including but not limited to infection, bleeding, injury to surrounding structures, possible need for reoperation. She agreed to the proposed surgery. OPERATIVE TECHNIQUE:  Patient was brought to the imaging suite.   She underwent a wire localization of the area of concern in the right breast.  She was then brought to the OR, placed in a supine position, properly padded and secured, and given a dose of IV antibiotics, and sequential compression devices were applied to her legs for DVT prophylaxis. Monitored anesthesia care was induced. The right breast was prepped and draped in the usual sterile fashion. Then, 1% lidocaine with epinephrine was used to infiltrate the skin and subcutaneous tissue at the targeted incision site. A radial incision was made near the 6 o'clock position with a 15-blade knife in the skin. The wire was identified and brought into the field. Using sharp dissection and electrocautery, a segment of breast tissue surrounding the tip of the wire was excised, carefully oriented with a short stitch, single clip superiorly and long stitch, double clip laterally and placed in the imaging device where specimen x-ray confirmed the presence of targeted clip, residual lesion with adequate margins as deemed by myself; and using sharp dissection electrocautery, 6 margins were then taken from the interior of the lumpectomy cavity. Each marked with a clip at true margin, individually labeled, and sent for permanent pathologic evaluation. The wound was irrigated. Hemostasis assured with electrocautery. Hemoclips were well placed around the periphery of the cavity to assist with subsequent surveillance. The wound was irrigated. Closure was accomplished with an interrupted 3-0 Vicryl for deep layer and a running 4-0 subcuticular Monocryl for the skin. Mastisol and Steri-Strips were applied, and 0.5% Marcaine was instilled in the cavity to assist with postoperative analgesia. A sterile dressing and compression bra were placed. Blood loss was minimal.  All counts were correct at the inclusion of the procedure. She tolerated this procedure well, and she was transferred to the recovery area in stable condition. Dictated By David Echeverria. Sabine Zamora M.D.  d: 02/03/2022 11:01:43  t: 02/03/2022 11:57:18  Job [id number]  ADELITA/    cc: Fer Mendoza.  Marc Lugo

## 2022-02-03 NOTE — TELEPHONE ENCOUNTER
Script sent. I think pt is surgery today, so maybe call tomorrow and let her know. When the Keaton Xie arrives, she can stop eliquis and start new med the next day. Take it once daily.

## 2022-02-03 NOTE — IMAGING NOTE
Assisted Dr. Rae Garber with needle localization of right breast for lumpectomy. Procedure explained and all questions answered. Pt verbalized understanding. Emotional support provided and pt tolerated procedure well with minimal discomfort. Wire(s) secured with Tegaderm dressing. Pt transported to OR holding via W/C with wire intact.

## 2022-02-04 ENCOUNTER — TELEPHONE (OUTPATIENT)
Dept: FAMILY MEDICINE CLINIC | Facility: CLINIC | Age: 87
End: 2022-02-04

## 2022-02-04 NOTE — TELEPHONE ENCOUNTER
Okay, sounds good. Glad she is feeling well. The only other thing I would watch is weight. If she gained 5 lbs overnight, I would be concerned.

## 2022-02-04 NOTE — TELEPHONE ENCOUNTER
Spoke with patient who state she doesn't even feel like she had surgery yesterday. Is in no pain and doing well. States she believes she discussed this with Dr Foster Urbina but wanted to let her know she is going to stop furosemide. States she is trying to eliminate the amount of pills she takes. Is not having any issues with leg swelling. Discussed stopping medication can cause swelling to return. If so let KE know. Also discussed furosemide helps control blood pressure by removing excess fluid. Patient should check her BP daily for 2 weeks after stopping medication to ensure BP does not start increasing. Patient verbalized understanding.

## 2022-02-07 ENCOUNTER — NURSE NAVIGATOR ENCOUNTER (OUTPATIENT)
Dept: HEMATOLOGY/ONCOLOGY | Facility: HOSPITAL | Age: 87
End: 2022-02-07

## 2022-02-07 NOTE — PROGRESS NOTES
Received phone call from patient that she had surgery with Dr. Terese Goss last week and that she was told by Dr. Terese Goss that she needed to be on a pill. I instructed the patient that Dr. Terese Goss will be referring her to an [address] [place] [address] oncologist and that she would be discussing this medication regimen with them at her consultation appointment. I stated to her that I would be happy to schedule that appointment now with her on the telephone or at her appointment with Dr. Terese Goss since myself or my partner would be visiting her at the time of her post-op appointment. Patient stated she would prefer a female medical oncologist and I offered the Tuesday February 15, 2022 at [phone] before her scheduled post-op appointment with Dr. Terese Goss and the patient stated she would need someone to drive her to that early of an appointment since she lives in [facility], but she will call me back to confirm the appointment. Patient thanked me for the assistance and the information. Pt was provided with breast nurse navigator contact information and was encouraged to phone with any other questions or concerns.

## 2022-02-11 ENCOUNTER — NURSE NAVIGATOR ENCOUNTER (OUTPATIENT)
Dept: HEMATOLOGY/ONCOLOGY | Facility: HOSPITAL | Age: 87
End: 2022-02-11

## 2022-02-11 NOTE — PROGRESS NOTES
Called patient back in regards to her VM she left about her concerns with her upcoming appointment with Dr. Ochoa López and the gap she has with her appointment on the same day with Dr. John Cruz on Tuesday February 15, 2022. Reassured the patient that myself or my partner, Sen Coe, that we will get her to her appointment with Dr. John Cruz and that she can sit in the waiting room or go to the cafeteria for a cup of coffee while waiting for her appointment with Dr. John Cruz. I offered the patient to come in another day to come in with the medical oncologist and the patient Festus Nelson declined and the patient asked me to look at the other medical oncologists schedules for that day to accommodate for her time, unfortunately, there was no other appointments with the other oncologists with appointments closer to 1100 or after. Patient thanked me for the phone call back and assistance. We moved her appointment with Dr. Ochoa López from 0830 to 0900 on Tuesday February 15, 2022 in the [facility]. Pt was provided with breast nurse navigator contact information and was encouraged to phone with any other questions or concerns.

## 2022-02-15 ENCOUNTER — OFFICE VISIT (OUTPATIENT)
Dept: HEMATOLOGY/ONCOLOGY | Facility: HOSPITAL | Age: 87
End: 2022-02-15
Attending: INTERNAL MEDICINE
Payer: MEDICARE

## 2022-02-15 ENCOUNTER — OFFICE VISIT (OUTPATIENT)
Dept: SURGERY | Facility: CLINIC | Age: 87
End: 2022-02-15
Payer: MEDICARE

## 2022-02-15 VITALS
SYSTOLIC BLOOD PRESSURE: 144 MMHG | HEART RATE: 69 BPM | OXYGEN SATURATION: 97 % | WEIGHT: 202 LBS | TEMPERATURE: 98 F | DIASTOLIC BLOOD PRESSURE: 78 MMHG | BODY MASS INDEX: 37 KG/M2 | RESPIRATION RATE: 18 BRPM

## 2022-02-15 VITALS
TEMPERATURE: 98 F | WEIGHT: 202 LBS | RESPIRATION RATE: 18 BRPM | BODY MASS INDEX: 37.17 KG/M2 | DIASTOLIC BLOOD PRESSURE: 78 MMHG | OXYGEN SATURATION: 97 % | SYSTOLIC BLOOD PRESSURE: 144 MMHG | HEART RATE: 69 BPM | HEIGHT: 62 IN

## 2022-02-15 DIAGNOSIS — Z17.0 MALIGNANT NEOPLASM OF LOWER-OUTER QUADRANT OF RIGHT BREAST OF FEMALE, ESTROGEN RECEPTOR POSITIVE (HCC): Primary | ICD-10-CM

## 2022-02-15 DIAGNOSIS — Z78.0 POSTMENOPAUSAL: Primary | ICD-10-CM

## 2022-02-15 DIAGNOSIS — Z17.0 MALIGNANT NEOPLASM OF OVERLAPPING SITES OF RIGHT BREAST IN FEMALE, ESTROGEN RECEPTOR POSITIVE (HCC): ICD-10-CM

## 2022-02-15 DIAGNOSIS — C50.511 MALIGNANT NEOPLASM OF LOWER-OUTER QUADRANT OF RIGHT BREAST OF FEMALE, ESTROGEN RECEPTOR POSITIVE (HCC): Primary | ICD-10-CM

## 2022-02-15 DIAGNOSIS — C50.811 MALIGNANT NEOPLASM OF OVERLAPPING SITES OF RIGHT BREAST IN FEMALE, ESTROGEN RECEPTOR POSITIVE (HCC): ICD-10-CM

## 2022-02-15 PROCEDURE — 3077F SYST BP >= 140 MM HG: CPT | Performed by: INTERNAL MEDICINE

## 2022-02-15 PROCEDURE — 3078F DIAST BP <80 MM HG: CPT | Performed by: INTERNAL MEDICINE

## 2022-02-15 PROCEDURE — 99205 OFFICE O/P NEW HI 60 MIN: CPT | Performed by: INTERNAL MEDICINE

## 2022-02-15 PROCEDURE — 3078F DIAST BP <80 MM HG: CPT | Performed by: SURGERY

## 2022-02-15 PROCEDURE — 3008F BODY MASS INDEX DOCD: CPT | Performed by: SURGERY

## 2022-02-15 PROCEDURE — 99024 POSTOP FOLLOW-UP VISIT: CPT | Performed by: SURGERY

## 2022-02-15 PROCEDURE — 3077F SYST BP >= 140 MM HG: CPT | Performed by: SURGERY

## 2022-02-15 NOTE — PROGRESS NOTES
Education Record    Learner:  Patient/ friend    Disease / Diagnosis:right breast cancer    Barriers / Limitations:  None   Comments:    Method:  Discussion   Comments:    General Topics:  Plan of care reviewed   Comments:    Outcome:  Shows understanding   Comments:  Healing well since surgery.    2/3/22 right breast lumpectomy

## 2022-03-01 ENCOUNTER — HOSPITAL ENCOUNTER (OUTPATIENT)
Dept: BONE DENSITY | Age: 87
Discharge: HOME | End: 2022-03-01
Attending: INTERNAL MEDICINE
Payer: MEDICARE

## 2022-03-01 DIAGNOSIS — Z78.0 POSTMENOPAUSAL: ICD-10-CM

## 2022-03-01 PROCEDURE — 77080 DXA BONE DENSITY AXIAL: CPT | Performed by: INTERNAL MEDICINE

## 2022-03-03 ENCOUNTER — TELEPHONE (OUTPATIENT)
Dept: HEMATOLOGY/ONCOLOGY | Facility: HOSPITAL | Age: 87
End: 2022-03-03

## 2022-03-04 ENCOUNTER — TELEPHONE (OUTPATIENT)
Dept: HEMATOLOGY/ONCOLOGY | Facility: HOSPITAL | Age: 87
End: 2022-03-04

## 2022-03-04 ENCOUNTER — VIRTUAL PHONE E/M (OUTPATIENT)
Dept: HEMATOLOGY/ONCOLOGY | Facility: HOSPITAL | Age: 87
End: 2022-03-04
Attending: INTERNAL MEDICINE
Payer: MEDICARE

## 2022-03-04 DIAGNOSIS — C50.811 MALIGNANT NEOPLASM OF OVERLAPPING SITES OF RIGHT BREAST IN FEMALE, ESTROGEN RECEPTOR POSITIVE (HCC): ICD-10-CM

## 2022-03-04 DIAGNOSIS — Z17.0 MALIGNANT NEOPLASM OF OVERLAPPING SITES OF RIGHT BREAST IN FEMALE, ESTROGEN RECEPTOR POSITIVE (HCC): ICD-10-CM

## 2022-03-04 PROCEDURE — 99442 PHONE E/M BY PHYS 11-20 MIN: CPT | Performed by: INTERNAL MEDICINE

## 2022-06-29 ENCOUNTER — OFFICE VISIT (OUTPATIENT)
Dept: FAMILY MEDICINE CLINIC | Facility: CLINIC | Age: 87
End: 2022-06-29
Payer: MEDICARE

## 2022-06-29 VITALS
HEART RATE: 60 BPM | BODY MASS INDEX: 39.07 KG/M2 | DIASTOLIC BLOOD PRESSURE: 70 MMHG | WEIGHT: 199 LBS | OXYGEN SATURATION: 97 % | RESPIRATION RATE: 16 BRPM | HEIGHT: 60 IN | TEMPERATURE: 98 F | SYSTOLIC BLOOD PRESSURE: 116 MMHG

## 2022-06-29 DIAGNOSIS — I51.89 DIASTOLIC DYSFUNCTION: ICD-10-CM

## 2022-06-29 DIAGNOSIS — I10 ESSENTIAL HYPERTENSION, BENIGN: ICD-10-CM

## 2022-06-29 DIAGNOSIS — Z79.899 ON STATIN THERAPY DUE TO RISK OF FUTURE CARDIOVASCULAR EVENT: ICD-10-CM

## 2022-06-29 DIAGNOSIS — I87.2 VENOUS INSUFFICIENCY (CHRONIC) (PERIPHERAL): ICD-10-CM

## 2022-06-29 DIAGNOSIS — K21.9 GASTROESOPHAGEAL REFLUX DISEASE WITHOUT ESOPHAGITIS: ICD-10-CM

## 2022-06-29 DIAGNOSIS — R01.1 MURMUR: ICD-10-CM

## 2022-06-29 DIAGNOSIS — Z95.5 H/O HEART ARTERY STENT: ICD-10-CM

## 2022-06-29 DIAGNOSIS — Z00.00 ENCOUNTER FOR ANNUAL HEALTH EXAMINATION: Primary | ICD-10-CM

## 2022-06-29 DIAGNOSIS — I25.10 CORONARY ARTERY DISEASE INVOLVING NATIVE CORONARY ARTERY OF NATIVE HEART WITHOUT ANGINA PECTORIS: ICD-10-CM

## 2022-06-29 DIAGNOSIS — Z95.5 PRESENCE OF CORONARY ANGIOPLASTY IMPLANT AND GRAFT: ICD-10-CM

## 2022-06-29 DIAGNOSIS — J84.10 CALCIFIED GRANULOMA OF LUNG (HCC): ICD-10-CM

## 2022-06-29 DIAGNOSIS — I48.0 PAROXYSMAL ATRIAL FIBRILLATION (HCC): ICD-10-CM

## 2022-06-29 DIAGNOSIS — I25.2 H/O ACUTE MYOCARDIAL INFARCTION: ICD-10-CM

## 2022-06-29 PROCEDURE — 3008F BODY MASS INDEX DOCD: CPT | Performed by: FAMILY MEDICINE

## 2022-06-29 PROCEDURE — 3078F DIAST BP <80 MM HG: CPT | Performed by: FAMILY MEDICINE

## 2022-06-29 PROCEDURE — 3074F SYST BP LT 130 MM HG: CPT | Performed by: FAMILY MEDICINE

## 2022-06-29 PROCEDURE — G0439 PPPS, SUBSEQ VISIT: HCPCS | Performed by: FAMILY MEDICINE

## 2022-06-29 PROCEDURE — 1125F AMNT PAIN NOTED PAIN PRSNT: CPT | Performed by: FAMILY MEDICINE

## 2022-06-29 PROCEDURE — 96160 PT-FOCUSED HLTH RISK ASSMT: CPT | Performed by: FAMILY MEDICINE

## 2022-06-29 PROCEDURE — 99397 PER PM REEVAL EST PAT 65+ YR: CPT | Performed by: FAMILY MEDICINE

## 2022-06-30 ENCOUNTER — MED REC SCAN ONLY (OUTPATIENT)
Dept: FAMILY MEDICINE CLINIC | Facility: CLINIC | Age: 87
End: 2022-06-30

## 2022-06-30 PROBLEM — J84.10 CALCIFIED GRANULOMA OF LUNG: Status: ACTIVE | Noted: 2022-06-30

## 2022-06-30 PROBLEM — J98.4 CALCIFIED GRANULOMA OF LUNG: Status: ACTIVE | Noted: 2022-06-30

## 2022-06-30 PROBLEM — J84.10 CALCIFIED GRANULOMA OF LUNG (HCC): Status: ACTIVE | Noted: 2022-06-30

## 2022-07-19 ENCOUNTER — OFFICE VISIT (OUTPATIENT)
Dept: SURGERY | Facility: CLINIC | Age: 87
End: 2022-07-19
Payer: MEDICARE

## 2022-07-19 ENCOUNTER — APPOINTMENT (OUTPATIENT)
Dept: HEMATOLOGY/ONCOLOGY | Facility: HOSPITAL | Age: 87
End: 2022-07-19
Attending: INTERNAL MEDICINE
Payer: MEDICARE

## 2022-07-19 VITALS
OXYGEN SATURATION: 97 % | BODY MASS INDEX: 38 KG/M2 | WEIGHT: 197 LBS | DIASTOLIC BLOOD PRESSURE: 80 MMHG | HEART RATE: 58 BPM | TEMPERATURE: 98 F | RESPIRATION RATE: 20 BRPM | SYSTOLIC BLOOD PRESSURE: 152 MMHG

## 2022-07-19 DIAGNOSIS — C50.811 MALIGNANT NEOPLASM OF OVERLAPPING SITES OF RIGHT BREAST IN FEMALE, ESTROGEN RECEPTOR POSITIVE (HCC): Primary | ICD-10-CM

## 2022-07-19 DIAGNOSIS — Z17.0 MALIGNANT NEOPLASM OF OVERLAPPING SITES OF RIGHT BREAST IN FEMALE, ESTROGEN RECEPTOR POSITIVE (HCC): Primary | ICD-10-CM

## 2022-07-19 DIAGNOSIS — C50.311 MALIGNANT NEOPLASM OF LOWER-INNER QUADRANT OF RIGHT BREAST OF FEMALE, ESTROGEN RECEPTOR POSITIVE (HCC): Primary | ICD-10-CM

## 2022-07-19 DIAGNOSIS — Z17.0 MALIGNANT NEOPLASM OF LOWER-INNER QUADRANT OF RIGHT BREAST OF FEMALE, ESTROGEN RECEPTOR POSITIVE (HCC): Primary | ICD-10-CM

## 2022-07-19 PROCEDURE — 3077F SYST BP >= 140 MM HG: CPT | Performed by: SURGERY

## 2022-07-19 PROCEDURE — 99213 OFFICE O/P EST LOW 20 MIN: CPT | Performed by: SURGERY

## 2022-07-19 PROCEDURE — 1126F AMNT PAIN NOTED NONE PRSNT: CPT | Performed by: SURGERY

## 2022-07-19 PROCEDURE — 3079F DIAST BP 80-89 MM HG: CPT | Performed by: SURGERY

## 2022-08-01 DIAGNOSIS — I48.0 PAROXYSMAL ATRIAL FIBRILLATION (HCC): ICD-10-CM

## 2022-08-01 DIAGNOSIS — K21.9 GASTROESOPHAGEAL REFLUX DISEASE: ICD-10-CM

## 2022-08-01 RX ORDER — OMEPRAZOLE 40 MG/1
40 CAPSULE, DELAYED RELEASE ORAL DAILY
Qty: 90 CAPSULE | Refills: 0 | Status: SHIPPED | OUTPATIENT
Start: 2022-08-01

## 2022-08-01 NOTE — TELEPHONE ENCOUNTER
LOV  6/29/22  Last refill xarelto 5/2/22 #90 0 refills      Omeprazole 1/16//22 #90 0 refills (note sig: states taking differently-40mg every other day  No future appointments.

## 2022-08-01 NOTE — TELEPHONE ENCOUNTER
Pt would like refills of:    rivaroxaban (XARELTO) 20 MG Oral Tab [[id number]] (Order [id number])  AND  Omeprazole 40 MG Oral Capsule Delayed Release [[id number]] (Order [id number]      Please send to:    [address], [address] [place] [address] [phone], [phone]   [address] [place] [address]   Phone: [phone] Fax: [phone]   Hours: Not open 24 hours     Thank you!

## 2022-09-12 ENCOUNTER — HOSPITAL ENCOUNTER (OUTPATIENT)
Dept: MAMMOGRAPHY | Facility: HOSPITAL | Age: 87
Discharge: HOME | End: 2022-09-12
Attending: SURGERY
Payer: MEDICARE

## 2022-09-12 DIAGNOSIS — Z17.0 MALIGNANT NEOPLASM OF LOWER-INNER QUADRANT OF RIGHT BREAST OF FEMALE, ESTROGEN RECEPTOR POSITIVE (HCC): ICD-10-CM

## 2022-09-12 DIAGNOSIS — C50.311 MALIGNANT NEOPLASM OF LOWER-INNER QUADRANT OF RIGHT BREAST OF FEMALE, ESTROGEN RECEPTOR POSITIVE (HCC): ICD-10-CM

## 2022-09-12 PROCEDURE — 77066 DX MAMMO INCL CAD BI: CPT | Performed by: SURGERY

## 2022-09-12 PROCEDURE — 77062 BREAST TOMOSYNTHESIS BI: CPT | Performed by: SURGERY

## 2022-09-16 ENCOUNTER — OFFICE VISIT (OUTPATIENT)
Dept: FAMILY MEDICINE CLINIC | Facility: CLINIC | Age: 87
End: 2022-09-16
Payer: MEDICARE

## 2022-09-16 VITALS
WEIGHT: 199 LBS | OXYGEN SATURATION: 99 % | DIASTOLIC BLOOD PRESSURE: 56 MMHG | BODY MASS INDEX: 36.62 KG/M2 | TEMPERATURE: 97 F | HEART RATE: 62 BPM | HEIGHT: 62 IN | SYSTOLIC BLOOD PRESSURE: 124 MMHG | RESPIRATION RATE: 16 BRPM

## 2022-09-16 DIAGNOSIS — I10 ESSENTIAL HYPERTENSION, BENIGN: ICD-10-CM

## 2022-09-16 DIAGNOSIS — R10.9 STOMACH PAIN: ICD-10-CM

## 2022-09-16 DIAGNOSIS — K21.9 GASTROESOPHAGEAL REFLUX DISEASE WITHOUT ESOPHAGITIS: Primary | ICD-10-CM

## 2022-09-16 DIAGNOSIS — I48.0 PAROXYSMAL ATRIAL FIBRILLATION (HCC): ICD-10-CM

## 2022-09-16 PROCEDURE — 3078F DIAST BP <80 MM HG: CPT | Performed by: FAMILY MEDICINE

## 2022-09-16 PROCEDURE — 99214 OFFICE O/P EST MOD 30 MIN: CPT | Performed by: FAMILY MEDICINE

## 2022-09-16 PROCEDURE — 3074F SYST BP LT 130 MM HG: CPT | Performed by: FAMILY MEDICINE

## 2022-09-16 PROCEDURE — 3008F BODY MASS INDEX DOCD: CPT | Performed by: FAMILY MEDICINE

## 2022-09-16 RX ORDER — OMEPRAZOLE 20 MG/1
20 CAPSULE, DELAYED RELEASE ORAL
Qty: 90 CAPSULE | Refills: 0 | Status: SHIPPED | OUTPATIENT
Start: 2022-09-16

## 2022-10-08 DIAGNOSIS — I10 ESSENTIAL HYPERTENSION, BENIGN: ICD-10-CM

## 2022-10-08 DIAGNOSIS — I48.0 PAROXYSMAL ATRIAL FIBRILLATION (HCC): ICD-10-CM

## 2022-10-08 DIAGNOSIS — I48.91 ATRIAL FIBRILLATION WITH TACHYCARDIC VENTRICULAR RATE (HCC): ICD-10-CM

## 2022-10-08 RX ORDER — METOPROLOL SUCCINATE 50 MG/1
50 TABLET, EXTENDED RELEASE ORAL DAILY
Qty: 90 TABLET | Refills: 1 | Status: SHIPPED | OUTPATIENT
Start: 2022-10-08

## 2022-10-08 NOTE — TELEPHONE ENCOUNTER
Metoprolol has been refilled per protocol. No refill protocol for this medication. Last refill: 8/01/2022 #90 with 0 refills  Last Visit: 9/16/2022   Next Visit: No future appointments. Forward to Dr. Foster Urbina please advise on refills. Thanks.

## 2022-10-08 NOTE — TELEPHONE ENCOUNTER
metoprolol succinate 50 MG Oral Tablet 24 Hr  ONLY HAS A FEW PILLS LEFT   &      rivaroxaban (XARELTO) 20 MG Oral Tab    PT STATES THAT SHE HAS ABOUT 2 WEEKS WORTH OF MEDICATION LEFT    PT WOULD LIKE SENT TO   [facility] - [address], [facility] [phone], [phone]

## 2022-10-31 DIAGNOSIS — I48.0 PAROXYSMAL ATRIAL FIBRILLATION (HCC): ICD-10-CM

## 2022-10-31 NOTE — TELEPHONE ENCOUNTER
rivaroxaban (XARELTO) 20 MG Oral Tab      PT WOULD LIKE SENT TO   [facility] - [address], [facility] [phone], [phone]

## 2022-10-31 NOTE — TELEPHONE ENCOUNTER
Last OV 9/16/22  Last refilled 10/8/22  #90  0 refill sent to [place], needs sent to [facility]

## 2022-11-01 ENCOUNTER — TELEPHONE (OUTPATIENT)
Dept: FAMILY MEDICINE CLINIC | Facility: CLINIC | Age: 87
End: 2022-11-01

## 2022-11-01 DIAGNOSIS — I48.0 PAROXYSMAL ATRIAL FIBRILLATION (HCC): Primary | ICD-10-CM

## 2022-11-01 NOTE — TELEPHONE ENCOUNTER
PT CALLED TO ADV THAT SHE JUST RECEIVED CALL THAT [facility] MAIL ORDER IS OVER $1000. PT WONDERING IF SHE CAN GO BACK ON ELIQUIS - SHE CAN GET THAT FOR $180.20.       PLEASE CALL AND ADV      THANK YOU

## 2022-11-02 ENCOUNTER — TELEPHONE (OUTPATIENT)
Dept: FAMILY MEDICINE CLINIC | Facility: CLINIC | Age: 87
End: 2022-11-02

## 2022-11-02 DIAGNOSIS — I48.0 PAROXYSMAL ATRIAL FIBRILLATION (HCC): ICD-10-CM

## 2022-11-02 NOTE — TELEPHONE ENCOUNTER
Patient requests that we send the eloquis rx to [facility] again  (see encounter from yesterday)    She states she was on the phone all morning and they said it will need to be sent again    Please adv  Thank you

## 2022-11-08 ENCOUNTER — TELEPHONE (OUTPATIENT)
Dept: FAMILY MEDICINE CLINIC | Facility: CLINIC | Age: 87
End: 2022-11-08

## 2022-11-08 DIAGNOSIS — I48.0 PAROXYSMAL ATRIAL FIBRILLATION (HCC): ICD-10-CM

## 2022-11-08 NOTE — TELEPHONE ENCOUNTER
Spoke Nik at [employer] customer service to confirm cost of medication. Per Connor Payne:  Xarelto 20 mg #90  $351.95  Eliquis 5mg  #180  $180.20    Per Connor Payne, has not open scripts on file for xarelto or Eliquis. Would need new one sent. Patient notified and verbalized understanding. States she would like Eliquis sent to Mail order. She is out of Eliquis at home. Would like to know if 1 week of samples can be given.     Routed to  to advise on script for mail order and samples

## 2022-11-08 NOTE — TELEPHONE ENCOUNTER
Patient now states that xarelto will be $10 cheaper through mail order than eloquis    She would like 90 day supply to [facility] and short term supply to [facility]    She has been out of med for 2 days    Please adv    Thank you

## 2022-12-23 ENCOUNTER — TELEPHONE (OUTPATIENT)
Dept: FAMILY MEDICINE CLINIC | Facility: CLINIC | Age: 87
End: 2022-12-23

## 2022-12-23 ENCOUNTER — OFFICE VISIT (OUTPATIENT)
Dept: FAMILY MEDICINE CLINIC | Facility: CLINIC | Age: 87
End: 2022-12-23
Payer: MEDICARE

## 2022-12-23 VITALS
BODY MASS INDEX: 36.62 KG/M2 | SYSTOLIC BLOOD PRESSURE: 130 MMHG | HEIGHT: 62 IN | DIASTOLIC BLOOD PRESSURE: 70 MMHG | RESPIRATION RATE: 16 BRPM | WEIGHT: 199 LBS | HEART RATE: 62 BPM | TEMPERATURE: 97 F | OXYGEN SATURATION: 96 %

## 2022-12-23 DIAGNOSIS — N30.01 ACUTE CYSTITIS WITH HEMATURIA: ICD-10-CM

## 2022-12-23 DIAGNOSIS — R30.0 DYSURIA: ICD-10-CM

## 2022-12-23 DIAGNOSIS — J01.00 ACUTE NON-RECURRENT MAXILLARY SINUSITIS: Primary | ICD-10-CM

## 2022-12-23 LAB
APPEARANCE: CLEAR
BILIRUBIN: NEGATIVE
GLUCOSE (URINE DIPSTICK): NEGATIVE MG/DL
KETONES (URINE DIPSTICK): NEGATIVE MG/DL
MULTISTIX LOT#: ABNORMAL NUMERIC
NITRITE, URINE: NEGATIVE
PH, URINE: 7 (ref 4.5–8)
PROTEIN (URINE DIPSTICK): 30 MG/DL
SPECIFIC GRAVITY: 1.02 (ref 1–1.03)
URINE-COLOR: YELLOW
UROBILINOGEN,SEMI-QN: 1 MG/DL (ref 0–1.9)

## 2022-12-23 RX ORDER — AMOXICILLIN AND CLAVULANATE POTASSIUM 875; 125 MG/1; MG/1
1 TABLET, FILM COATED ORAL 2 TIMES DAILY
Qty: 20 TABLET | Refills: 0 | Status: SHIPPED | OUTPATIENT
Start: 2022-12-23 | End: 2023-01-02

## 2022-12-23 NOTE — TELEPHONE ENCOUNTER
Called and spoke with patient regarding feeling like passing out. States she was sitting talking on the phone when she felt lightheaded, like she was going to pass out. Feeling lasted a few seconds. She did not pass out. No shortness of breath or chest pain    States she was sick about a week ago. Has been feeling tired like she doesn't have any energy  States she had a cold. Was coughing, spitting stuff up and blowing her nose  No vomiting or diarrhea. States she has not had any lightheadedness since. States she lives close by and is fine to drive.     Will see KE today at 230

## 2022-12-28 ENCOUNTER — TELEPHONE (OUTPATIENT)
Dept: FAMILY MEDICINE CLINIC | Facility: CLINIC | Age: 87
End: 2022-12-28

## 2022-12-28 DIAGNOSIS — R30.0 DYSURIA: Primary | ICD-10-CM

## 2022-12-28 NOTE — TELEPHONE ENCOUNTER
Lets repeat the urine culture. She is on augmentin for sinus infection as well, she can continue that in the mean time. Encourage water intake.

## 2022-12-28 NOTE — TELEPHONE ENCOUNTER
Pt provided urine specimen on 12/23    Specimen was unable to be run at the lab    Pt will need repeat urine culture    Lab:   Urine Culture Routine    Does KE want her to repeat Urine or should we  Check on pt symptoms?

## 2022-12-28 NOTE — TELEPHONE ENCOUNTER
Pt reports still with symptoms - burning at end of urnation  No fevers, no back/abd pain    Advised pt that Dr. Bertha Sebastian not in office today - pt ok to receive call back tomorrow    Need to repeat urine culture?   Please advise, thank you

## 2022-12-28 NOTE — TELEPHONE ENCOUNTER
Advised patient of Doctor's note below. Patient verbalized understanding. No further questions at this time.     Future Appointments   Date Time Provider Jaye Tripathi   12/29/2022  2:00 PM  [facility] EMG Annita Muñoz

## 2022-12-29 ENCOUNTER — NURSE ONLY (OUTPATIENT)
Dept: FAMILY MEDICINE CLINIC | Facility: CLINIC | Age: 87
End: 2022-12-29
Payer: MEDICARE

## 2022-12-29 DIAGNOSIS — R30.0 DYSURIA: ICD-10-CM

## 2022-12-29 PROCEDURE — 87086 URINE CULTURE/COLONY COUNT: CPT | Performed by: FAMILY MEDICINE

## 2022-12-29 NOTE — PROGRESS NOTES
Patient to clinic to provide urine sample for culture    Urine culture sent in grey top tube    Patient left office in stable condition

## 2022-12-31 ENCOUNTER — TELEPHONE (OUTPATIENT)
Dept: FAMILY MEDICINE CLINIC | Facility: CLINIC | Age: 87
End: 2022-12-31

## 2022-12-31 NOTE — TELEPHONE ENCOUNTER
----- Message from Burt Rey DO sent at 12/31/2022 10:18 AM CST -----  Pls let pt know that her urine culture is negative. She does not have a UTI. Perhaps something else in the area is irritated or there is another bladder irritant like caffiene or soda or increased sweets causing symptoms? Is her cough getting better with the antibiotic?

## 2023-01-05 ENCOUNTER — TELEPHONE (OUTPATIENT)
Dept: FAMILY MEDICINE CLINIC | Facility: CLINIC | Age: 88
End: 2023-01-05

## 2023-01-05 DIAGNOSIS — I48.91 ATRIAL FIBRILLATION WITH TACHYCARDIC VENTRICULAR RATE (HCC): ICD-10-CM

## 2023-01-05 DIAGNOSIS — I10 ESSENTIAL HYPERTENSION, BENIGN: ICD-10-CM

## 2023-01-05 RX ORDER — METOPROLOL SUCCINATE 50 MG/1
50 TABLET, EXTENDED RELEASE ORAL DAILY
Qty: 90 TABLET | Refills: 1 | Status: SHIPPED | OUTPATIENT
Start: 2023-01-05

## 2023-01-05 NOTE — TELEPHONE ENCOUNTER
[address], [address] [facility] [phone], [phone]   [address] [place] [address]   Phone: [phone] Fax: [phone]   Hours: Not open 24 hours     PATIENT REQUESTING REFILL ON METOPROLOL

## 2023-01-05 NOTE — TELEPHONE ENCOUNTER
Last OV 12/23/22  Last lab 2/1/22  BMP/creat 0.83  Last refilled 10/8/22  #90  1 refill    Refilled per protocol to have on file

## 2023-01-10 ENCOUNTER — TELEPHONE (OUTPATIENT)
Dept: FAMILY MEDICINE CLINIC | Facility: CLINIC | Age: 88
End: 2023-01-10

## 2023-01-10 NOTE — TELEPHONE ENCOUNTER
Patient states it still hurts with urinating. States it happens toward the end of urination  Rates pains as 1- 1.5 out of 10    States surrounding area looks normal. No irritation  States happens every time she urinates  No pain any other time  No blood in urine     Does not have a lot of caffeine or sugar in her diet. States she has increased water intake.  Drinks 3-5 glass of water per day      Would like to know what else KE can recommend to figure out what is causing discomfort

## 2023-01-10 NOTE — TELEPHONE ENCOUNTER
Patient notified and verbalized understanding. Advised can turn urine orange.   Let KE know in 1-2 weeks if not improving, sooner if worsening

## 2023-01-10 NOTE — TELEPHONE ENCOUNTER
PATIENT SAYS SHE TESTED NEGATIVE FOR UTI. PATIENT SAYS SHE IS STILL GETTING A BURNING SENSATION WHEN URINATING.  PATIENT IS ASKING A AN XRAY WOULD SHOW ANYTHING?

## 2023-01-23 DIAGNOSIS — I10 ESSENTIAL HYPERTENSION, BENIGN: ICD-10-CM

## 2023-01-23 DIAGNOSIS — I25.10 ATHEROSCLEROSIS OF NATIVE CORONARY ARTERY OF NATIVE HEART WITHOUT ANGINA PECTORIS: ICD-10-CM

## 2023-01-23 RX ORDER — POTASSIUM CHLORIDE 1500 MG/1
TABLET, EXTENDED RELEASE ORAL
Qty: 90 TABLET | Refills: 3 | Status: SHIPPED | OUTPATIENT
Start: 2023-01-23

## 2023-01-23 RX ORDER — ROSUVASTATIN CALCIUM 20 MG/1
TABLET, COATED ORAL
Qty: 90 TABLET | Refills: 3 | Status: SHIPPED | OUTPATIENT
Start: 2023-01-23

## 2023-01-23 NOTE — TELEPHONE ENCOUNTER
Cholesterol Medication Protocol Failed 01/23/2023 12:43 AM   Protocol Details  ALT < 80    ALT resulted within past year    Lipid panel within past 12 months    Appointment within past 12 or next 3 months     Last OV 12/23/22  Last lab:  2/1/22 lipid, bmp/potassium 4.9  3/18/21 cmp/alt 22  Both meds last refilled 1/28/22  #90  3 refills

## 2023-02-13 DIAGNOSIS — I48.0 PAROXYSMAL ATRIAL FIBRILLATION (HCC): ICD-10-CM

## 2023-02-13 NOTE — TELEPHONE ENCOUNTER
PT CALLED AND ADV NEEDS REFILL OF     apixaban (ELIQUIS) 5 MG Oral Tab    ** PLEASE SEND TO [facility] **      THANK YOU

## 2023-03-06 ENCOUNTER — OFFICE VISIT (OUTPATIENT)
Dept: FAMILY MEDICINE CLINIC | Facility: CLINIC | Age: 88
End: 2023-03-06
Payer: MEDICARE

## 2023-03-06 VITALS
OXYGEN SATURATION: 99 % | RESPIRATION RATE: 16 BRPM | BODY MASS INDEX: 37.73 KG/M2 | WEIGHT: 205 LBS | DIASTOLIC BLOOD PRESSURE: 60 MMHG | TEMPERATURE: 98 F | HEART RATE: 50 BPM | HEIGHT: 62 IN | SYSTOLIC BLOOD PRESSURE: 110 MMHG

## 2023-03-06 DIAGNOSIS — I25.2 H/O ACUTE MYOCARDIAL INFARCTION: ICD-10-CM

## 2023-03-06 DIAGNOSIS — I48.0 PAROXYSMAL ATRIAL FIBRILLATION (HCC): ICD-10-CM

## 2023-03-06 DIAGNOSIS — Z95.5 PRESENCE OF CORONARY ANGIOPLASTY IMPLANT AND GRAFT: ICD-10-CM

## 2023-03-06 DIAGNOSIS — K21.9 GASTROESOPHAGEAL REFLUX DISEASE WITHOUT ESOPHAGITIS: ICD-10-CM

## 2023-03-06 DIAGNOSIS — I51.89 DIASTOLIC DYSFUNCTION: ICD-10-CM

## 2023-03-06 DIAGNOSIS — R01.1 MURMUR: ICD-10-CM

## 2023-03-06 DIAGNOSIS — Z00.00 ENCOUNTER FOR ANNUAL HEALTH EXAMINATION: Primary | ICD-10-CM

## 2023-03-06 DIAGNOSIS — Z95.5 H/O HEART ARTERY STENT: ICD-10-CM

## 2023-03-06 DIAGNOSIS — R30.0 DYSURIA: ICD-10-CM

## 2023-03-06 DIAGNOSIS — I25.10 CORONARY ARTERY DISEASE INVOLVING NATIVE CORONARY ARTERY OF NATIVE HEART WITHOUT ANGINA PECTORIS: ICD-10-CM

## 2023-03-06 DIAGNOSIS — Z79.899 ON STATIN THERAPY DUE TO RISK OF FUTURE CARDIOVASCULAR EVENT: ICD-10-CM

## 2023-03-06 DIAGNOSIS — I87.2 VENOUS INSUFFICIENCY (CHRONIC) (PERIPHERAL): ICD-10-CM

## 2023-03-06 DIAGNOSIS — J84.10 CALCIFIED GRANULOMA OF LUNG (HCC): ICD-10-CM

## 2023-03-06 DIAGNOSIS — I10 ESSENTIAL HYPERTENSION, BENIGN: ICD-10-CM

## 2023-03-06 LAB
ALBUMIN SERPL-MCNC: 3.5 G/DL (ref 3.4–5)
ALBUMIN/GLOB SERPL: 1.2 {RATIO} (ref 1–2)
ALP LIVER SERPL-CCNC: 48 U/L
ALT SERPL-CCNC: 16 U/L
ANION GAP SERPL CALC-SCNC: 0 MMOL/L (ref 0–18)
AST SERPL-CCNC: 17 U/L (ref 15–37)
BASOPHILS # BLD AUTO: 0.04 X10(3) UL (ref 0–0.2)
BASOPHILS NFR BLD AUTO: 0.8 %
BILIRUB SERPL-MCNC: 1.3 MG/DL (ref 0.1–2)
BUN BLD-MCNC: 14 MG/DL (ref 7–18)
CALCIUM BLD-MCNC: 9.5 MG/DL (ref 8.5–10.1)
CHLORIDE SERPL-SCNC: 108 MMOL/L (ref 98–112)
CHOLEST SERPL-MCNC: 116 MG/DL (ref ?–200)
CO2 SERPL-SCNC: 32 MMOL/L (ref 21–32)
CREAT BLD-MCNC: 0.88 MG/DL
EOSINOPHIL # BLD AUTO: 0.12 X10(3) UL (ref 0–0.7)
EOSINOPHIL NFR BLD AUTO: 2.4 %
ERYTHROCYTE [DISTWIDTH] IN BLOOD BY AUTOMATED COUNT: 13.3 %
FASTING PATIENT LIPID ANSWER: NO
FASTING STATUS PATIENT QL REPORTED: NO
GFR SERPLBLD BASED ON 1.73 SQ M-ARVRAT: 64 ML/MIN/1.73M2 (ref 60–?)
GLOBULIN PLAS-MCNC: 3 G/DL (ref 2.8–4.4)
GLUCOSE BLD-MCNC: 119 MG/DL (ref 70–99)
HCT VFR BLD AUTO: 42.3 %
HDLC SERPL-MCNC: 60 MG/DL (ref 40–59)
HGB BLD-MCNC: 13.6 G/DL
IMM GRANULOCYTES # BLD AUTO: 0.01 X10(3) UL (ref 0–1)
IMM GRANULOCYTES NFR BLD: 0.2 %
LDLC SERPL CALC-MCNC: 33 MG/DL (ref ?–100)
LYMPHOCYTES # BLD AUTO: 1.15 X10(3) UL (ref 1–4)
LYMPHOCYTES NFR BLD AUTO: 23 %
MCH RBC QN AUTO: 30 PG (ref 26–34)
MCHC RBC AUTO-ENTMCNC: 32.2 G/DL (ref 31–37)
MCV RBC AUTO: 93.4 FL
MONOCYTES # BLD AUTO: 0.56 X10(3) UL (ref 0.1–1)
MONOCYTES NFR BLD AUTO: 11.2 %
NEUTROPHILS # BLD AUTO: 3.12 X10 (3) UL (ref 1.5–7.7)
NEUTROPHILS # BLD AUTO: 3.12 X10(3) UL (ref 1.5–7.7)
NEUTROPHILS NFR BLD AUTO: 62.4 %
NONHDLC SERPL-MCNC: 56 MG/DL (ref ?–130)
OSMOLALITY SERPL CALC.SUM OF ELEC: 292 MOSM/KG (ref 275–295)
PLATELET # BLD AUTO: 153 10(3)UL (ref 150–450)
POTASSIUM SERPL-SCNC: 5.4 MMOL/L (ref 3.5–5.1)
PROT SERPL-MCNC: 6.5 G/DL (ref 6.4–8.2)
RBC # BLD AUTO: 4.53 X10(6)UL
SODIUM SERPL-SCNC: 140 MMOL/L (ref 136–145)
TRIGL SERPL-MCNC: 133 MG/DL (ref 30–149)
VLDLC SERPL CALC-MCNC: 18 MG/DL (ref 0–30)
WBC # BLD AUTO: 5 X10(3) UL (ref 4–11)

## 2023-03-06 PROCEDURE — 85025 COMPLETE CBC W/AUTO DIFF WBC: CPT | Performed by: FAMILY MEDICINE

## 2023-03-06 PROCEDURE — 80061 LIPID PANEL: CPT | Performed by: FAMILY MEDICINE

## 2023-03-06 PROCEDURE — 80053 COMPREHEN METABOLIC PANEL: CPT | Performed by: FAMILY MEDICINE

## 2023-03-07 ENCOUNTER — TELEPHONE (OUTPATIENT)
Dept: FAMILY MEDICINE CLINIC | Facility: CLINIC | Age: 88
End: 2023-03-07

## 2023-03-07 DIAGNOSIS — M25.551 RIGHT HIP PAIN: Primary | ICD-10-CM

## 2023-03-07 NOTE — TELEPHONE ENCOUNTER
PATIENT HAD HER ANNUAL VISIT WITH DR VELÁZQUEZ YESTERDAY. SHE SAYS DURING HER VISIT, SHE DISCUSSED WITH DR VELÁZQUEZ THAT HE RIGHT HIP WAS BOTHERING HER AND ASKING FOR HIP XRAY. PATIENT CALLING TO SCHEDULE. CAN DR VELÁQZUEZ PUT THAT ORDER IN. PATIENT ON HER WAY OUT AND WILL CALL BACK LATER TO SCHEDULE.

## 2023-03-07 NOTE — TELEPHONE ENCOUNTER
PATIENT IS AT HER DERMATOLOGIST OFFICE. SHE SAYS HER DERM WANTS TO PUT HER ON TERBINAFINE. IS THAT OK WITH DR VELÁZQUEZ? RECENT LABS WERE FAXED TO DERM OFFICE PER PATIENT REQUEST ALSO.

## 2023-03-08 ENCOUNTER — HOSPITAL ENCOUNTER (OUTPATIENT)
Dept: GENERAL RADIOLOGY | Age: 88
Discharge: HOME | End: 2023-03-08
Attending: FAMILY MEDICINE
Payer: MEDICARE

## 2023-03-08 DIAGNOSIS — M25.551 RIGHT HIP PAIN: ICD-10-CM

## 2023-03-08 PROCEDURE — 73502 X-RAY EXAM HIP UNI 2-3 VIEWS: CPT | Performed by: FAMILY MEDICINE

## 2023-03-10 ENCOUNTER — TELEPHONE (OUTPATIENT)
Dept: FAMILY MEDICINE CLINIC | Facility: CLINIC | Age: 88
End: 2023-03-10

## 2023-03-10 NOTE — TELEPHONE ENCOUNTER
----- Message from Rey Torres DO sent at 3/9/2023 12:48 PM CST -----  Pls let pt know that her xray of hip shows some mild arthritis changes. No masses. If it bothers you, we can consider physical therapy to see if that helps.

## 2023-03-21 NOTE — Clinical Note
Can you confirm how she is taking her meds and what she needs sent to mail order. I would like her to get eliquis from cardiology. Let me know if she needs some to make it until her cardiology appt.
[id number]

## 2023-04-04 ENCOUNTER — TELEPHONE (OUTPATIENT)
Dept: FAMILY MEDICINE CLINIC | Facility: CLINIC | Age: 88
End: 2023-04-04

## 2023-04-04 NOTE — TELEPHONE ENCOUNTER
Called and spoke with Leopold Becker at [facility] who states she can switch this medication for autorefill. States $15 will be billed to card on file every 3 months when sent. Patient notified and verbalized understanding.    Nothing further needed

## 2023-04-04 NOTE — TELEPHONE ENCOUNTER
Patient requests refill    metoprolol succinate ER 50 MG Oral Tablet 24 Hr    Patient advised that [facility]     She states it is really hard for her to call them and request refills as it's all recordings    Can we call to get this filled for her? Can we set her up on auto-fill?     Please adv  Thank you    [facility]

## 2023-04-13 ENCOUNTER — TELEPHONE (OUTPATIENT)
Dept: FAMILY MEDICINE CLINIC | Facility: CLINIC | Age: 88
End: 2023-04-13

## 2023-04-13 DIAGNOSIS — E87.5 SERUM POTASSIUM ELEVATED: Primary | ICD-10-CM

## 2023-04-13 NOTE — TELEPHONE ENCOUNTER
Letter mailed to patient reminding her she is due for labs per pt reminder.       Lab Frequency Next Occurrence   BASIC METABOLIC PANEL (8) Once 89/77/2811     Mj [facility]man Nurse  Recall BMP in 1 month

## 2023-05-02 ENCOUNTER — NURSE ONLY (OUTPATIENT)
Dept: FAMILY MEDICINE CLINIC | Facility: CLINIC | Age: 88
End: 2023-05-02
Payer: MEDICARE

## 2023-05-02 DIAGNOSIS — E87.5 SERUM POTASSIUM ELEVATED: ICD-10-CM

## 2023-05-02 LAB
ANION GAP SERPL CALC-SCNC: 2 MMOL/L (ref 0–18)
BUN BLD-MCNC: 12 MG/DL (ref 7–18)
CALCIUM BLD-MCNC: 9.1 MG/DL (ref 8.5–10.1)
CHLORIDE SERPL-SCNC: 105 MMOL/L (ref 98–112)
CO2 SERPL-SCNC: 32 MMOL/L (ref 21–32)
CREAT BLD-MCNC: 1.17 MG/DL
FASTING STATUS PATIENT QL REPORTED: NO
GFR SERPLBLD BASED ON 1.73 SQ M-ARVRAT: 46 ML/MIN/1.73M2 (ref 60–?)
GLUCOSE BLD-MCNC: 98 MG/DL (ref 70–99)
OSMOLALITY SERPL CALC.SUM OF ELEC: 288 MOSM/KG (ref 275–295)
POTASSIUM SERPL-SCNC: 4.2 MMOL/L (ref 3.5–5.1)
SODIUM SERPL-SCNC: 139 MMOL/L (ref 136–145)

## 2023-05-02 PROCEDURE — 80048 BASIC METABOLIC PNL TOTAL CA: CPT | Performed by: FAMILY MEDICINE

## 2023-05-02 NOTE — PROGRESS NOTES
Patient to clinic for labs per KE    Mint tube drawn x 1 attempt    Patient left office in stable condition

## 2023-05-13 DIAGNOSIS — I48.0 PAROXYSMAL ATRIAL FIBRILLATION (HCC): ICD-10-CM

## 2023-05-13 DIAGNOSIS — K21.9 GASTROESOPHAGEAL REFLUX DISEASE WITHOUT ESOPHAGITIS: ICD-10-CM

## 2023-05-13 RX ORDER — OMEPRAZOLE 20 MG/1
20 CAPSULE, DELAYED RELEASE ORAL
Qty: 90 CAPSULE | Refills: 0 | Status: SHIPPED | OUTPATIENT
Start: 2023-05-13

## 2023-05-13 NOTE — TELEPHONE ENCOUNTER
apixaban (ELIQUIS) 5 MG Oral Tab    omeprazole 20 MG Oral Capsule Delayed Release      [facility] - T[place] Esquivel, [address] [phone], [phone]      KLOR-CON M20 21 MEQ Oral Tab Crb    Pt also would like to know why this medication was prescribed for her?     Thank you      Pt call back # [phone]

## 2023-05-19 ENCOUNTER — TELEPHONE (OUTPATIENT)
Dept: FAMILY MEDICINE CLINIC | Facility: CLINIC | Age: 88
End: 2023-05-19

## 2023-05-19 DIAGNOSIS — K21.9 GASTROESOPHAGEAL REFLUX DISEASE WITHOUT ESOPHAGITIS: ICD-10-CM

## 2023-05-19 DIAGNOSIS — I48.0 PAROXYSMAL ATRIAL FIBRILLATION (HCC): ICD-10-CM

## 2023-05-19 RX ORDER — OMEPRAZOLE 20 MG/1
20 CAPSULE, DELAYED RELEASE ORAL
Qty: 90 CAPSULE | Refills: 0 | Status: SHIPPED | OUTPATIENT
Start: 2023-05-19

## 2023-05-19 NOTE — TELEPHONE ENCOUNTER
Per EBER, ok to resend to mail order. Scripts [facility] to [facility]. Spoke with staff at [facility] who states they have medication ready for patient, total $132    Patient notified and verbalized understanding. States she will  from local.  Aware scripts have been sent to mail order for next time.

## 2023-05-19 NOTE — TELEPHONE ENCOUNTER
PT CALLED AND ADV THAT SHE STILL DIDN'T GET HER MEDICATIONS. LOOK AT TE: 5/13/23 - PER TE, MEDICATION WAS SUPPOSE TO BE SENT TO [facility]. ADV PT THAT MEDS WENT TO [facility]. SHE SAID THAT WAS WRONG AND IS OUT OF MEDICATION. LOOKING FOR REFILLS TO GET HER THROUGH UNTIL SHE CAN GET MEDS THROUGH MAIL ORDER.       PLEASE CALL AND ADV    THANK YOU

## 2023-06-12 DIAGNOSIS — I10 ESSENTIAL HYPERTENSION, BENIGN: ICD-10-CM

## 2023-06-12 RX ORDER — FUROSEMIDE 20 MG/1
20 TABLET ORAL DAILY
Qty: 90 TABLET | Refills: 0 | Status: SHIPPED | OUTPATIENT
Start: 2023-06-12

## 2023-06-12 NOTE — TELEPHONE ENCOUNTER
Pt states she went off water pills but now feet are swelling. Would like to be back on water pills. Please call to confirm that they were approved. Thank you!         [address], [address] [place] [address] [phone], [phone]   [address] [place] [address]   Phone: [phone] Fax: [phone]     Pt also states she has a cardiology appt in July/

## 2023-06-12 NOTE — TELEPHONE ENCOUNTER
Spoke with patient who states she has noticed mild swelling in her ankles. States she has no issues getting a shoe on. States she noticed socks are a little tight around the ankle. No cough or shortness of breath. Would like to go back on lasix to get rid of ankle swelling.   States [facility], not local [facility]       Last refilled 8-

## 2023-07-03 DIAGNOSIS — I10 ESSENTIAL HYPERTENSION, BENIGN: ICD-10-CM

## 2023-07-03 DIAGNOSIS — I48.91 ATRIAL FIBRILLATION WITH TACHYCARDIC VENTRICULAR RATE (HCC): ICD-10-CM

## 2023-07-03 RX ORDER — METOPROLOL SUCCINATE 50 MG/1
50 TABLET, EXTENDED RELEASE ORAL DAILY
Qty: 90 TABLET | Refills: 3 | Status: SHIPPED | OUTPATIENT
Start: 2023-07-03

## 2023-07-17 ENCOUNTER — TELEPHONE (OUTPATIENT)
Dept: FAMILY MEDICINE CLINIC | Facility: CLINIC | Age: 88
End: 2023-07-17

## 2023-07-17 DIAGNOSIS — I25.10 ATHEROSCLEROSIS OF NATIVE CORONARY ARTERY OF NATIVE HEART WITHOUT ANGINA PECTORIS: ICD-10-CM

## 2023-07-17 DIAGNOSIS — I10 ESSENTIAL HYPERTENSION, BENIGN: ICD-10-CM

## 2023-07-17 RX ORDER — ROSUVASTATIN CALCIUM 20 MG/1
10 TABLET, COATED ORAL NIGHTLY
Qty: 90 TABLET | Refills: 3 | COMMUNITY
Start: 2023-07-17

## 2023-07-17 NOTE — TELEPHONE ENCOUNTER
Spoke with patient who confirmed furosemide and potassium discontinued. Rosuvastatin reduced to 10 mg.     Furosemide and potassium removed from med list   Updated rosuvastatin 10 mg as historical

## 2023-07-17 NOTE — TELEPHONE ENCOUNTER
Pt saw Dr Aakash Vasquez on Friday, he told her to stop potassium and the water pill, the rosuvastatin to be cut in half to 10 mg's instead of 20 mg's.

## 2023-09-13 ENCOUNTER — TELEPHONE (OUTPATIENT)
Dept: FAMILY MEDICINE CLINIC | Facility: CLINIC | Age: 88
End: 2023-09-13

## 2023-09-13 DIAGNOSIS — R74.8 ELEVATED CREATINE KINASE: ICD-10-CM

## 2023-09-13 DIAGNOSIS — R30.0 DYSURIA: Primary | ICD-10-CM

## 2023-09-18 ENCOUNTER — NURSE ONLY (OUTPATIENT)
Dept: FAMILY MEDICINE CLINIC | Facility: CLINIC | Age: 88
End: 2023-09-18
Payer: MEDICARE

## 2023-09-18 DIAGNOSIS — R74.8 ELEVATED CREATINE KINASE: ICD-10-CM

## 2023-09-18 DIAGNOSIS — E78.5 HYPERLIPIDEMIA, UNSPECIFIED HYPERLIPIDEMIA TYPE: Primary | ICD-10-CM

## 2023-09-18 DIAGNOSIS — R30.0 DYSURIA: ICD-10-CM

## 2023-09-18 LAB
ANION GAP SERPL CALC-SCNC: 3 MMOL/L (ref 0–18)
APPEARANCE: CLEAR
BILIRUBIN: NEGATIVE
BUN BLD-MCNC: 13 MG/DL (ref 7–18)
CALCIUM BLD-MCNC: 9.5 MG/DL (ref 8.5–10.1)
CHLORIDE SERPL-SCNC: 106 MMOL/L (ref 98–112)
CHOLEST SERPL-MCNC: 145 MG/DL (ref ?–200)
CO2 SERPL-SCNC: 29 MMOL/L (ref 21–32)
CREAT BLD-MCNC: 0.93 MG/DL
EGFRCR SERPLBLD CKD-EPI 2021: 60 ML/MIN/1.73M2 (ref 60–?)
FASTING PATIENT LIPID ANSWER: YES
GLUCOSE (URINE DIPSTICK): NEGATIVE MG/DL
GLUCOSE BLD-MCNC: 97 MG/DL (ref 70–99)
HDLC SERPL-MCNC: 62 MG/DL (ref 40–59)
KETONES (URINE DIPSTICK): NEGATIVE MG/DL
LDLC SERPL CALC-MCNC: 61 MG/DL (ref ?–100)
LEUKOCYTES: NEGATIVE
MULTISTIX LOT#: ABNORMAL NUMERIC
NITRITE, URINE: NEGATIVE
NONHDLC SERPL-MCNC: 83 MG/DL (ref ?–130)
OSMOLALITY SERPL CALC.SUM OF ELEC: 286 MOSM/KG (ref 275–295)
PH, URINE: 5.5 (ref 4.5–8)
POTASSIUM SERPL-SCNC: 4.1 MMOL/L (ref 3.5–5.1)
PROTEIN (URINE DIPSTICK): NEGATIVE MG/DL
SODIUM SERPL-SCNC: 138 MMOL/L (ref 136–145)
SPECIFIC GRAVITY: 1.02 (ref 1–1.03)
TRIGL SERPL-MCNC: 124 MG/DL (ref 30–149)
URINE-COLOR: YELLOW
UROBILINOGEN,SEMI-QN: 0.2 MG/DL (ref 0–1.9)
VLDLC SERPL CALC-MCNC: 18 MG/DL (ref 0–30)

## 2023-09-18 PROCEDURE — 87086 URINE CULTURE/COLONY COUNT: CPT | Performed by: FAMILY MEDICINE

## 2023-09-18 PROCEDURE — 80048 BASIC METABOLIC PNL TOTAL CA: CPT | Performed by: INTERNAL MEDICINE

## 2023-09-18 PROCEDURE — 80061 LIPID PANEL: CPT | Performed by: INTERNAL MEDICINE

## 2023-09-19 ENCOUNTER — TELEPHONE (OUTPATIENT)
Dept: FAMILY MEDICINE CLINIC | Facility: CLINIC | Age: 88
End: 2023-09-19

## 2023-09-19 DIAGNOSIS — R31.29 OTHER MICROSCOPIC HEMATURIA: Primary | ICD-10-CM

## 2023-09-19 DIAGNOSIS — R30.0 DYSURIA: ICD-10-CM

## 2023-09-19 NOTE — TELEPHONE ENCOUNTER
I placed an order for a CT to check the urinary tract to see if there is something causing the bleeding.

## 2023-09-19 NOTE — TELEPHONE ENCOUNTER
Patient notified and verbalized understanding. Patient requesting order faxed to [facility] as it is easier for her to get there.     Routed to referral dept to authorize for [facility]   NPI [id number]

## 2023-09-19 NOTE — TELEPHONE ENCOUNTER
Patient notified and verbalized understanding. Patient states this issue has been going on since she last saw KE in March. It is not any better.  Would like to do further testing to figure out what is causing her symptoms

## 2023-09-19 NOTE — TELEPHONE ENCOUNTER
----- Message from Marianna Martines DO sent at 9/19/2023  2:45 PM CDT -----  Pls let pt know that her urine showed some blood, but no infection. No UTI. Kidney tests are normal, potassium is normal. Let me know if urine symptoms get worse. We can follow up and see if the blood is still there with a repeat urine test in 1-2 months.

## 2023-09-20 NOTE — TELEPHONE ENCOUNTER
[facility] to [facility] team to update on auth for CT Urogram- pt is scheduled for 9/25/23

## 2023-09-20 NOTE — TELEPHONE ENCOUNTER
PLEASE UPDATE ARASH OSWALD [place] WITH ANY UPDATES ON CT REFERRAL    PT HAVING CT DONE ON 9/25     PHONE: [phone]

## 2023-09-22 NOTE — TELEPHONE ENCOUNTER
Per epic, referral is approved  Marcelle Townsend at [facility] notified. States she is able to view approval and patient is all set. Patient notified and verbalized understanding.

## 2023-09-28 ENCOUNTER — TELEPHONE (OUTPATIENT)
Dept: FAMILY MEDICINE CLINIC | Facility: CLINIC | Age: 88
End: 2023-09-28

## 2023-09-28 DIAGNOSIS — R31.9 HEMATURIA, UNSPECIFIED TYPE: Primary | ICD-10-CM

## 2023-09-28 NOTE — TELEPHONE ENCOUNTER
Per KE, no significant findings on CT that would explain blood in urine. Next step would be urology consult  Per KE, Dr Heather Christianson for urology    Patient notified and verbalized understanding.    Number to schedule with Dr Heather Christianson provided    Referral entered

## 2023-09-30 ENCOUNTER — TELEPHONE (OUTPATIENT)
Dept: FAMILY MEDICINE CLINIC | Facility: CLINIC | Age: 88
End: 2023-09-30

## 2023-09-30 NOTE — TELEPHONE ENCOUNTER
Pt needs the following refilled:    metoprolol succinate ER 50 MG Oral Tablet 24 Hr [[id number]] (Order [id number]     Please send to:  [address], [address] [place] [address] [phone], [phone]   [address] [place] [address]   Phone: [phone] Fax: [phone]   Thank you!

## 2023-09-30 NOTE — TELEPHONE ENCOUNTER
[facility] and spoke with Eloisa Christensen and they have the script and it is on auto refill  Next script will be processed on 10/1/23      Called the pt and advised- she v/u

## 2023-10-03 ENCOUNTER — MED REC SCAN ONLY (OUTPATIENT)
Dept: FAMILY MEDICINE CLINIC | Facility: CLINIC | Age: 88
End: 2023-10-03

## 2023-11-02 RX ORDER — ROSUVASTATIN CALCIUM 5 MG/1
5 TABLET, COATED ORAL NIGHTLY
Qty: 90 TABLET | Refills: 1 | Status: SHIPPED | OUTPATIENT
Start: 2023-11-02

## 2023-11-02 NOTE — TELEPHONE ENCOUNTER
Last OV 3/6/23  Last labs:   9/18/23 lipid  3/6/23 cmp/alt 16  Lats refilled 1/23/23  #90  3 refill    Has been taking rosuvastatin 1/2 of a 10 mg tablet. States Dr Veronica De Leon last prescribed it. Asking if KE can refill for rosuvastatin 5 mg       Also has questions regarding omepraozole. Had a doctor tell her Omperpazole is bad for her. States she is currently taking 20 mg every 2 days. Asking if she can go down to 10 mg with her next refill.     Routed to EBER to advise

## 2023-11-02 NOTE — TELEPHONE ENCOUNTER
PT CALLED AND ADV NEEDS REFILL OF SOME MEDS - PT ADV THAT WHEN SHE SAW DR ARGUELLO IN JUNE - SHE WAS ADV TO ONLY TAKE 5MG. SHE HAS BEEN CUTTING MEDS IN HALF.     WOULD LIKE TO KNOW IF PT CAN GET CORRECT SCRIPT     rosuvastatin 20 MG Oral Tab (5MG)    PLEASE ADV    [facility]     THANK YOU

## 2023-11-04 DIAGNOSIS — I48.0 PAROXYSMAL ATRIAL FIBRILLATION (HCC): ICD-10-CM

## 2023-11-04 NOTE — TELEPHONE ENCOUNTER
LOV 03/06/23  Last refill on 05/19/23, for #180 tabs, with 0 refills  apixaban (ELIQUIS) 5 MG Oral Tab     No future appointments. Order(s) pending, please review. Thank you.   [facility]

## 2023-12-15 ENCOUNTER — TELEPHONE (OUTPATIENT)
Dept: FAMILY MEDICINE CLINIC | Facility: CLINIC | Age: 88
End: 2023-12-15

## 2023-12-15 NOTE — TELEPHONE ENCOUNTER
[address], [address] [place] [address] [phone], [phone] [address] [place] [address] Phone: [phone] Fax: [phone] Hours: Not open 24 hours     PATIENT CALLED AND SCHEDULED HER PHYSICAL FOR NEXT YEAR.  SHE REQUESTS A REFILL ON METOPROLOL ER 50MG

## 2023-12-15 NOTE — TELEPHONE ENCOUNTER
Year supply sent in July. Should have refills available    Patient notified and verbalized understanding.

## 2024-01-24 DIAGNOSIS — K21.9 GASTROESOPHAGEAL REFLUX DISEASE WITHOUT ESOPHAGITIS: ICD-10-CM

## 2024-01-24 DIAGNOSIS — I48.91 ATRIAL FIBRILLATION WITH TACHYCARDIC VENTRICULAR RATE (HCC): ICD-10-CM

## 2024-01-24 DIAGNOSIS — I48.0 PAROXYSMAL ATRIAL FIBRILLATION (HCC): ICD-10-CM

## 2024-01-24 DIAGNOSIS — I10 ESSENTIAL HYPERTENSION, BENIGN: ICD-10-CM

## 2024-01-24 NOTE — TELEPHONE ENCOUNTER
Forward to Dr. Boothe, medications are pended and pharmacy is updated. Please advise on refills to new pharmacy.

## 2024-01-24 NOTE — TELEPHONE ENCOUNTER
Patient has new mail order pharmacy  Pharmacy requests refill    omeprazole 20 MG Oral Capsule Delayed Release   (Patient states she has been taking 10mg)    rosuvastatin 5 MG Oral Tab     metoprolol succinate ER 50 MG Oral Tablet 24 Hr    apixaban (ELIQUIS) 5 MG Oral Tab     [facility]  [phone]  Fax [phone]

## 2024-01-25 RX ORDER — METOPROLOL SUCCINATE 50 MG/1
50 TABLET, EXTENDED RELEASE ORAL DAILY
Qty: 90 TABLET | Refills: 3 | Status: SHIPPED | OUTPATIENT
Start: 2024-01-25

## 2024-01-25 RX ORDER — OMEPRAZOLE 20 MG/1
20 CAPSULE, DELAYED RELEASE ORAL
Qty: 90 CAPSULE | Refills: 0 | Status: SHIPPED | OUTPATIENT
Start: 2024-01-25

## 2024-01-25 RX ORDER — ROSUVASTATIN CALCIUM 5 MG/1
5 TABLET, COATED ORAL NIGHTLY
Qty: 90 TABLET | Refills: 1 | Status: SHIPPED | OUTPATIENT
Start: 2024-01-25

## 2024-03-12 ENCOUNTER — OFFICE VISIT (OUTPATIENT)
Dept: FAMILY MEDICINE CLINIC | Facility: CLINIC | Age: 89
End: 2024-03-12
Payer: MEDICARE

## 2024-03-12 VITALS
TEMPERATURE: 98 F | WEIGHT: 203 LBS | HEART RATE: 67 BPM | DIASTOLIC BLOOD PRESSURE: 76 MMHG | SYSTOLIC BLOOD PRESSURE: 122 MMHG | RESPIRATION RATE: 20 BRPM | BODY MASS INDEX: 37 KG/M2 | OXYGEN SATURATION: 96 %

## 2024-03-12 DIAGNOSIS — I25.2 H/O ACUTE MYOCARDIAL INFARCTION: ICD-10-CM

## 2024-03-12 DIAGNOSIS — I51.89 DIASTOLIC DYSFUNCTION: ICD-10-CM

## 2024-03-12 DIAGNOSIS — I10 ESSENTIAL HYPERTENSION, BENIGN: ICD-10-CM

## 2024-03-12 DIAGNOSIS — Z00.00 ENCOUNTER FOR ANNUAL HEALTH EXAMINATION: Primary | ICD-10-CM

## 2024-03-12 DIAGNOSIS — Z79.899 ON STATIN THERAPY DUE TO RISK OF FUTURE CARDIOVASCULAR EVENT: ICD-10-CM

## 2024-03-12 DIAGNOSIS — I87.2 VENOUS INSUFFICIENCY (CHRONIC) (PERIPHERAL): ICD-10-CM

## 2024-03-12 DIAGNOSIS — R31.1 BENIGN ESSENTIAL MICROSCOPIC HEMATURIA: ICD-10-CM

## 2024-03-12 DIAGNOSIS — K21.9 GASTROESOPHAGEAL REFLUX DISEASE WITHOUT ESOPHAGITIS: ICD-10-CM

## 2024-03-12 DIAGNOSIS — R01.1 MURMUR: ICD-10-CM

## 2024-03-12 DIAGNOSIS — Z95.5 H/O HEART ARTERY STENT: ICD-10-CM

## 2024-03-12 DIAGNOSIS — I48.0 PAROXYSMAL ATRIAL FIBRILLATION (HCC): ICD-10-CM

## 2024-03-12 DIAGNOSIS — Z95.5 PRESENCE OF CORONARY ANGIOPLASTY IMPLANT AND GRAFT: ICD-10-CM

## 2024-03-12 DIAGNOSIS — J84.10 CALCIFIED GRANULOMA OF LUNG (HCC): ICD-10-CM

## 2024-03-12 DIAGNOSIS — I25.10 CORONARY ARTERY DISEASE INVOLVING NATIVE CORONARY ARTERY OF NATIVE HEART WITHOUT ANGINA PECTORIS: ICD-10-CM

## 2024-03-12 LAB
BILIRUB UR QL STRIP.AUTO: NEGATIVE
CLARITY UR REFRACT.AUTO: CLEAR
GLUCOSE UR STRIP.AUTO-MCNC: NORMAL MG/DL
KETONES UR STRIP.AUTO-MCNC: NEGATIVE MG/DL
LEUKOCYTE ESTERASE UR QL STRIP.AUTO: NEGATIVE
NITRITE UR QL STRIP.AUTO: NEGATIVE
PH UR STRIP.AUTO: 6.5 [PH] (ref 5–8)
PROT UR STRIP.AUTO-MCNC: NEGATIVE MG/DL
RBC UR QL AUTO: NEGATIVE
SP GR UR STRIP.AUTO: 1.01 (ref 1–1.03)
UROBILINOGEN UR STRIP.AUTO-MCNC: NORMAL MG/DL

## 2024-03-12 PROCEDURE — 81003 URINALYSIS AUTO W/O SCOPE: CPT | Performed by: FAMILY MEDICINE

## 2024-03-12 NOTE — PATIENT INSTRUCTIONS
Avis Rios's SCREENING SCHEDULE   Tests on this list are recommended by your physician but may not be covered, or covered at this frequency, by your insurer.   Please check with your insurance carrier before scheduling to verify coverage.   PREVENTATIVE SERVICES FREQUENCY &  COVERAGE DETAILS LAST COMPLETION DATE   Diabetes Screening    Fasting Blood Sugar /  Glucose    One screening every 12 months if never tested or if previously tested but not diagnosed with pre-diabetes   One screening every 6 months if diagnosed with pre-diabetes Lab Results   Component Value Date    GLU 97 09/18/2023        Cardiovascular Disease Screening    Lipid Panel  Cholesterol  Lipoprotein (HDL)  Triglycerides Covered every 5 years for all Medicare beneficiaries without apparent signs or symptoms of cardiovascular disease Lab Results   Component Value Date    CHOLEST 145 09/18/2023    HDL 62 (H) 09/18/2023    LDL 61 09/18/2023    TRIG 124 09/18/2023         Electrocardiogram (EKG)   Covered if needed at Welcome to Medicare, and non-screening if indicated for medical reasons 02/01/2022      Ultrasound Screening for Abdominal Aortic Aneurysm (AAA) Covered once in a lifetime for one of the following risk factors   • Men who are 65-75 years old and have ever smoked   • Anyone with a family history -     Colorectal Cancer Screening  Covered for ages 50-85; only need ONE of the following:    Colonoscopy   Covered every 10 years    Covered every 2 years if patient is at high risk or previous colonoscopy was abnormal -    No recommendations at this time    Flexible Sigmoidoscopy   Covered every 4 years -    Fecal Occult Blood Test Covered annually -   Bone Density Screening    Bone density screening    Covered every 2 years after age 65 if diagnosed with risk of osteoporosis or estrogen deficiency.    Covered yearly for long-term glucocorticoid medication use (Steroids) Last Dexa Scan:    XR DEXA BONE DENSITOMETRY (CPT=77080)  03/01/2022      No recommendations at this time   Pap and Pelvic    Pap   Covered every 2 years for women at normal risk; Annually if at high risk -  No recommendations at this time    Chlamydia Annually if high risk -  No recommendations at this time   Screening Mammogram    Mammogram     Recommend annually for all female patients aged 40 and older    One baseline mammogram covered for patients aged 35-39 09/12/2022    No recommendations at this time    Immunizations    Influenza Covered once per flu season  Please get every year -  Influenza Vaccine(1) due on 10/01/2023    Pneumococcal Each vaccine (Pmmccel12 & [id number]) covered once after 65 Prevnar 13: 10/20/2018    Cecunnvad70: 12/09/2019     No recommendations at this time    Hepatitis B One screening covered for patients with certain risk factors   -  No recommendations at this time    Tetanus Toxoid Not covered by Medicare Part B unless medically necessary (cut with metal); may be covered with your pharmacy prescription benefits -    Tetanus, Diptheria and Pertusis TD and TDaP Not covered by Medicare Part B -  No recommendations at this time    Zoster Not covered by Medicare Part B; may be covered with your pharmacy  prescription benefits -  Zoster Vaccines(1 of 2) Never done

## 2024-03-12 NOTE — PROGRESS NOTES
HPI:   Avis Rios is a 88 year old female who presents for a Medicare Subsequent Annual Wellness visit (Pt already had Initial Annual Wellness).    Getting up 3 x night, it hurts when she has to go. It is annoying, not a severe pain. Wears pads.     Left ear hurts at times. No drainage. Sons say her hearing is worse.     Heart burn is okay. On PPI omeprazole every 2-3 days. Tried going off of it, didn't worse.     Following with Dr. Washington for breast cancer. Healing well. Last mammogram was normal.     Following with cardiology for atrial fib, doing well. Saw Dr. Holloway. Appointment in July.       in  year. She is keeping busy with friends, reading, knitting, [employer]. Lives alone, but her child is in the area.       She has been screened for Falls and is low risk.    She had a completely normal cognitive assessment - see flowsheet entries    Avis Rios has a completely normal functional assessment. See flowsheet for details.      Depression Screening (PHQ-2/PHQ-9): Over the LAST 2 WEEKS   Little interest or pleasure in doing things: Not at all  Feeling down, depressed, or hopeless: Not at all  PHQ-2 SCORE: 0      Advanced Directive:  She does NOT have a Living Will on file in [facility].   Advance care planning including the explanation and discussion of advance directives standard forms performed Face to Face with patient and Family/surrogate (if present), and forms available to patient in AVS   POLST forms signed, see scan.      She does NOT have a Power of  for Health Care on file in [facility].   Advance care planning including the explanation and discussion of advance directives standard forms performed Face to Face with patient and Family/surrogate (if present), and forms available to patient in AVS       She smoked tobacco in the past but quit greater than 12 months ago.  Social History    Tobacco Use      Smoking status: Former        Packs/day: 0.50        Years: 30.00        Additional  pack years: 0.00        Total pack years: 15.00        Types: Cigarettes      Smokeless tobacco: Former      Tobacco comments: quit 30 yrs ago       Ms. Rios does not currently take aspirin. We discussed the risks and benefits of aspirin therapy.   Avis Rios is unable to use daily aspirin therapy For the following reasons:   Already on other anticoagulant usage such as Plavix, Xarelto, Lovenox, or warfarin        CAGE Alcohol screening   Avis Rios was screened for Alcohol abuse and had a score of 0 so is at low risk.    Patient Care Team: Patient Care Team:  Piedad Boothe DO as PCP - General (Family Medicine)  Ankita Turcios, RN as Registered Nurse (Registered Nurse)    Patient Active Problem List:     Essential hypertension, benign - controlled today      Paroxysmal atrial fibrillation (HCC) - noted, controlled with metoprolol and on anticoagulation      Coronary artery disease involving native coronary artery of native heart without angina pectoris     H/O heart artery stent - noted.      Diastolic dysfunction - doing well, lasix as needed for swelling in LE     GERD (gastroesophageal reflux disease) - on PPI.      H/O acute myocardial infarction     Murmur     On statin therapy due to risk of future cardiovascular event     Presence of coronary angioplasty implant and graft     Venous insufficiency (chronic) (peripheral) - lasix as needed for swelling helps.     Wt Readings from Last 3 Encounters:   03/12/24 203 lb (92.1 kg)   03/06/23 205 lb (93 kg)   12/23/22 199 lb (90.3 kg)      Last Cholesterol Labs:   Lab Results   Component Value Date    CHOLEST 145 09/18/2023    HDL 62 (H) 09/18/2023    LDL 61 09/18/2023    TRIG 124 09/18/2023          Last Chemistry Labs:   Lab Results   Component Value Date    AST 17 03/06/2023    ALT 16 03/06/2023    CA 9.5 09/18/2023    ALB 3.5 03/06/2023    CREATSERUM 0.93 09/18/2023    GLU 97 09/18/2023        CBC  (most recent labs)   Lab Results   Component Value  Date    WBC 5.0 03/06/2023    HGB 13.6 03/06/2023    .0 03/06/2023        ALLERGIES:   She is allergic to lipitor [atorvastatin].    CURRENT MEDICATIONS:   Outpatient Medications Marked as Taking for the 3/12/24 encounter (Office Visit) with Piedad Boothe DO   Medication Sig    apixaban (ELIQUIS) 5 MG Oral Tab Take 1 tablet (5 mg total) by mouth 2 (two) times daily.    metoprolol succinate ER 50 MG Oral Tablet 24 Hr Take 1 tablet (50 mg total) by mouth daily.    omeprazole 20 MG Oral Capsule Delayed Release Take 1 capsule (20 mg total) by mouth every morning before breakfast.    rosuvastatin 5 MG Oral Tab Take 1 tablet (5 mg total) by mouth nightly.    rosuvastatin 20 MG Oral Tab Take 0.5 tablets (10 mg total) by mouth nightly.      MEDICAL INFORMATION:   She  has a past medical history of Abdominal hernia, Arthritis, Atrial fibrillation (HCC), Back pain, Breast cancer (HCC), CAD (coronary artery disease), Easy bruising, High cholesterol, HTN (hypertension), Itch of skin, Leaking of urine, Pain in joints, Stented coronary artery, and Wears glasses.    She  has a past surgical history that includes angiogram (2012); appendectomy (1955); knee arthroscopy (Left, 1997); lysis of adhesions (1958); hysterectomy (1992); cataract (Bilateral); lumpectomy right; and appendectomy.    Her family history includes Asthma in her mother; Breast Cancer in her self; Heart Disease in her mother; congestive heart failure in her father; multiple myeloma (age of onset: 60) in her sister; rheumatoid arthritis in her sister.   SOCIAL HISTORY:   She  reports that she has quit smoking. Her smoking use included cigarettes. She has a 15 pack-year smoking history. She has quit using smokeless tobacco. She reports current alcohol use of about 2.0 standard drinks of alcohol per week. She reports that she does not use drugs.     REVIEW OF SYSTEMS:   GENERAL: feels well otherwise  SKIN: denies any unusual skin lesions  EYES: denies  blurred vision or double vision  HEENT: denies nasal congestion, sinus pain or ST  LUNGS: denies shortness of breath with exertion  CARDIOVASCULAR: denies chest pain on exertion  GI: denies abdominal pain, denies heartburn  : denies dysuria, vaginal discharge or itching, + urinary incontinence, she wears pads most of the time for leaks.    MUSCULOSKELETAL: denies back pain or joint pain   NEURO: denies headaches or dizziness   PSYCHE: denies depression or anxiety, coping okay with life.   HEMATOLOGIC: denies hx of anemia  ENDOCRINE: denies thyroid history  ALL/ASTHMA: denies hx of allergy or asthma    EXAM:   /76 (BP Location: Left arm, Patient Position: Sitting, Cuff Size: large)   Pulse 67   Temp 97.7 °F (36.5 °C) (Temporal)   Resp 20   Wt 203 lb (92.1 kg)   SpO2 96%   BMI 37.13 kg/m²  Estimated body mass index is 37.13 kg/m² as calculated from the following:    Height as of 3/6/23: 5' 2\" (1.575 m).    Weight as of this encounter: 203 lb (92.1 kg).    Medicare Hearing Assessment  (Required for AWV/SWV)    Hearing Screening    Time taken: 3/12/2024 12:56 PM  Screening Method: Finger Rub  Finger Rub Result: Pass                   Visual   Visual Acuity     Vision Screen Test Type: Snellen Wall Chart    Right Eye Visual Acuity: Corrected Right Eye Chart Acuity: 20/25   Left Eye Visual Acuity: Corrected Left Eye Chart Acuity: 20/25   Both Eyes Visual Acuity: Corrected Both Eyes Chart Acuity: 20/25         General Appearance:  Alert, cooperative, no distress, appears stated age   Head:  Normocephalic, without obvious abnormality, atraumatic   Eyes:  PERRL, conjunctiva/corneas clear, EOM's intact both eyes   Ears:  Normal TM's and external ear canals, both ears   Nose: Nares normal, septum midline,mucosa normal, no drainage or sinus tenderness   Throat: Lips, mucosa, and tongue normal; teeth and gums normal   Neck: Supple, symmetrical, trachea midline, no adenopathy;  thyroid: not enlarged, symmetric, no  tenderness/mass/nodules;     Back:   Symmetric, no curvature, ROM normal, no CVA tenderness   Lungs:   Clear to auscultation bilaterally, respirations unlabored   Heart:  Regular rate and rhythm, S1 and S2 normal, no murmur, rub, or gallop   Abdomen:   Soft, non-tender, bowel sounds active all four quadrants,  no masses, no organomegaly   Pelvic: Deferred   Extremities: Extremities normal, atraumatic, no cyanosis or edema   Pulses: 2+ and symmetric   Skin: Skin color, texture, turgor normal, no rashes or lesions   Lymph nodes: Cervical, supraclavicular, and axillary nodes normal   Neurologic: Normal       Vaccination History     Immunization History   Administered Date(s) Administered    Covid-19 Vaccine Pfizer 30 mcg/0.3 ml 02/24/2021, 04/08/2021, 10/19/2021    FLU VAC High Dose 65 YRS & Older PRSV Free (30854) 10/11/2019, 10/19/2021, 10/20/2022    Fluzone Vaccine Medicare () 10/11/2019    Pneumococcal (Prevnar 13) 12/10/2017, 10/20/2018    Pneumovax 23 12/09/2019        ASSESSMENT AND OTHER RELEVANT CHRONIC CONDITIONS:   Avis Rios is a 88 year old female who presents for a Medicare Assessment.     PLAN SUMMARY:   Diagnoses and all orders for this visit:    Encounter for annual health examination  - completed today   - has had pneumonia and shingles vaccines before she moved here, but we do not have records.     Essential hypertension, benign  - controlled with meds   Paroxysmal atrial fibrillation (HCC)  - on metoprolol and eliquis   - following with cardiology     Coronary artery disease involving native coronary artery of native heart without angina pectoris  -     CBC WITH DIFFERENTIAL WITH PLATELET; Future  -     COMP METABOLIC PANEL (14); Future  -     LIPID PANEL; Future  -     VENIPUNCTURE    Diastolic dysfunction    Noted, following with Dr. Holloway     H/O acute myocardial infarction  - noted.   Murmur  - noted, not heard on exam today   Presence of coronary angioplasty implant and graft  -  noted   Venous insufficiency (chronic) (peripheral)  - can take lasix as needed for swelling, but will need to check potassium since she is taking a supplement if she significantly decreases taking it.     Gastroesophageal reflux disease without esophagitis  - noted, doing well on PPI   H/O heart artery stent  - noted.   On statin therapy due to risk of future cardiovascular event  - encouraged to continue. Pt agrees for now.     Calcified Granuloma of lung   - seen on CXR in 2017 and 2019 and stable.     Hematuria - recheck urine. CT urogram normal in October. If still has blood, then would place urology or urogyne referral. She prefers female physician.        Diet assessment: good     PLAN:  The patient indicates understanding of these issues and agrees to the plan.  Reinforced healthy diet, lifestyle, and exercise.    No follow-ups on file.     Piedad Boothe DO, 12/7/2020     General Health     In the past six months, have you lost more than 10 pounds without trying?: 2 - No  Has your appetite been poor?: No  How does the patient maintain a good energy level?: Daily Walks  How would you describe your daily physical activity?: Light  How do you maintain positive mental well-being?: Puzzles;Games;Visiting Friends;Visiting Family      This section provided for quick review of chart, separate sheet to patient  PREVENTATIVE SERVICES  INDICATIONS AND SCHEDULE Internal Lab or Procedure External Lab or Procedure   Diabetes Screening      HbgA1C   Annually No results found for: \"A1C\"      No data to display                Fasting Blood Sugar (FSB)Annually Glucose (mg/dL)   Date Value   09/18/2023 97          Cardiovascular Disease Screening     LDL Annually LDL Cholesterol (mg/dL)   Date Value   09/18/2023 61        EKG - w/ Initial Preventative Physical Exam only, or if medically necessary Electrocardiogram date       Colorectal Cancer Screening      Colonoscopy Screen every 10 years No recommendations at this time  Update Health Maintenance if applicable    Flex Sigmoidoscopy Screen every 10 years No results found for this or any previous visit.      No data to display                 Fecal Occult Blood Annually Occult Blood (no units)   Date Value   04/14/2021 Positive (A)         No data to display                Glaucoma Screening      Ophthalmology Visit Annually: Diabetics, FHx Glaucoma, AA>50, > 65      No data to display                Bone Density Screening      Dexascan Every two years Last Dexa Scan:    XR DEXA BONE DENSITOMETRY (CPT=77080) 03/01/2022        No data to display                Pap and Pelvic      Pap: Every 3 yrs age 21-65 or Pap+HPV every 5 yrs age 30-65, age 65 and older at high risk No recommendations at this time Update Health Maintenance if applicable    Chlamydia  Annually if high risk No results found for: \"CHLAMYDIA\"      No data to display                Screening Mammogram      Mammogram Annually to 75, then as discussed No recommendations at this time Update Health Maintenance if applicable     Immunizations (Update Immunization Activity if applicable)     Influenza  Covered Annually 10/20/2022 Please get every year    Pneumococcal 13 (Prevnar)  Covered Once after 65 12/10/2017 Please get once after your 65th birthday    Pneumococcal 23 (Pneumovax)  Covered Once after 65 12/09/2019 Please get once after your 65th birthday    Hepatitis B for Moderate/High Risk No vaccine history found Medium/high risk factors:   End-stage renal disease   Hemophiliacs who received Factor VIII or IX concentrates   Clients of institutions for the mentally retarded   Persons who live in the same house as a HepB virus carrier   Homosexual men   Illicit injectable drug abusers     Tetanus Toxoid  Only covered with a cut with metal- TD and TDaP Not covered by Medicare Part B No vaccine history found This may be covered with your prescription benefits, but Medicare does not cover unless Medically needed     Zoster  Not covered by Medicare Part B No vaccine history found This may be covered with your pharmacy  prescription benefits                        Template: [facility] MEDICARE ANNUAL ASSESSMENT FEMALE [87330]

## 2024-04-12 ENCOUNTER — TELEPHONE (OUTPATIENT)
Dept: FAMILY MEDICINE CLINIC | Facility: CLINIC | Age: 89
End: 2024-04-12

## 2024-04-12 NOTE — TELEPHONE ENCOUNTER
Lab Frequency Next Occurrence   CBC With Differential With Platelet Once 03/12/2024   Comp Metabolic Panel (14) Once 03/12/2024   Lipid Panel Once 03/12/2024     Letter mailed to patient reminding them they have outstanding orders.

## 2024-04-22 ENCOUNTER — OFFICE VISIT (OUTPATIENT)
Dept: FAMILY MEDICINE CLINIC | Facility: CLINIC | Age: 89
End: 2024-04-22
Payer: MEDICARE

## 2024-04-22 VITALS
BODY MASS INDEX: 37 KG/M2 | DIASTOLIC BLOOD PRESSURE: 70 MMHG | RESPIRATION RATE: 18 BRPM | HEART RATE: 53 BPM | SYSTOLIC BLOOD PRESSURE: 126 MMHG | OXYGEN SATURATION: 97 % | WEIGHT: 203.63 LBS | TEMPERATURE: 98 F

## 2024-04-22 DIAGNOSIS — R51.9 NONINTRACTABLE HEADACHE, UNSPECIFIED CHRONICITY PATTERN, UNSPECIFIED HEADACHE TYPE: Primary | ICD-10-CM

## 2024-04-22 PROCEDURE — 99214 OFFICE O/P EST MOD 30 MIN: CPT | Performed by: FAMILY MEDICINE

## 2024-04-22 NOTE — PROGRESS NOTES
Avis Rios is a 88 year old female.  Chief Complaint   Patient presents with    Follow - Up     Hospital Follow up Discharged on 4/20/24        HPI:   Was out to ice cream with a friend and she couldn't get words to come out right.   Her firend said she didn't look like herself.   Went home, talked for a few min, felt okay.   Then got a headache on left side and behind left eye. Took a tylenol (pain was 1/10).   Called son. Another friend came and called an ambulance.   BP was high when they got there.   She was brought to [facility].   They did testing. CT neg, had MRI head.   Carotid doppler was wnl.   ECHO with bubble study was negative. Good cardiac function.   They thought it was mini stroke, or a mild migraine.   BP's at home running 120-130/70-80.     She had not felt any afib episodes or palpitations around that time (like afib was acting up).   She is on eliquis long term.     ALLERGIES:  Allergies   Allergen Reactions    Lipitor [Atorvastatin] ITCHING         Current Outpatient Medications   Medication Sig Dispense Refill    apixaban (ELIQUIS) 5 MG Oral Tab Take 1 tablet (5 mg total) by mouth 2 (two) times daily. 180 tablet 0    metoprolol succinate ER 50 MG Oral Tablet 24 Hr Take 1 tablet (50 mg total) by mouth daily. 90 tablet 3    omeprazole 20 MG Oral Capsule Delayed Release Take 1 capsule (20 mg total) by mouth every morning before breakfast. 90 capsule 0    rosuvastatin 20 MG Oral Tab Take 0.5 tablets (10 mg total) by mouth nightly. 90 tablet 3    rosuvastatin 5 MG Oral Tab Take 1 tablet (5 mg total) by mouth nightly. (Patient not taking: Reported on 4/22/2024) 90 tablet 1    Multiple Vitamins-Minerals (CENTRUM SILVER) Oral Tab Take 1 tablet by mouth daily. (Patient not taking: Reported on 3/12/2024)        Past Medical History:    Abdominal hernia    Arthritis    Atrial fibrillation (HCC)    Back pain    Breast cancer (HCC)    CAD (coronary artery disease)    Easy bruising    High cholesterol     HTN (hypertension)    Itch of skin    Leaking of urine    Pain in joints    Stented coronary artery    Wears glasses      Social History:  Social History     Socioeconomic History    Marital status:    Tobacco Use    Smoking status: Former     Current packs/day: 0.50     Average packs/day: 0.5 packs/day for 30.0 years (15.0 ttl pk-yrs)     Types: Cigarettes    Smokeless tobacco: Former    Tobacco comments:     quit 30 yrs ago   Vaping Use    Vaping status: Never Used   Substance and Sexual Activity    Alcohol use: Yes     Alcohol/week: 2.0 standard drinks of alcohol     Types: 2 Glasses of wine per week     Comment: sometime daily    Drug use: Never     Social Determinants of Health     Food Insecurity: No Food Insecurity (4/20/2024)    Received from [facility]    Food Insecurity     Currently or in the past 3 months, have you worried your food would run out before you had money to buy more?: No     In the past 12 months, have you run out of food or been unable to get more?: No   Transportation Needs: No Transportation Needs (4/20/2024)    Received from [facility]    Transportation Needs     Medical Transportation Needs?: No    Received from [facility], [facility]    Social Connections    Received from [facility], [facility]    Housing Stability        BP Readings from Last 6 Encounters:   04/22/24 126/70   03/12/24 122/76   03/06/23 110/60   12/23/22 130/70   09/16/22 124/56   07/19/22 152/80       Wt Readings from Last 6 Encounters:   04/22/24 203 lb 9.6 oz (92.4 kg)   03/12/24 203 lb (92.1 kg)   03/06/23 205 lb (93 kg)   12/23/22 199 lb (90.3 kg)   09/16/22 199 lb (90.3 kg)   07/19/22 197 lb (89.4 kg)       REVIEW OF SYSTEMS:   GENERAL HEALTH: feels well no complaints other than above   SKIN: denies any unusual skin lesions or rashes  RESPIRATORY: denies shortness of breath with  exertion  CARDIOVASCULAR: denies chest pain on exertion  GI: denies abdominal pain and denies heartburn  NEURO: denies headaches since being home, feeling fine     EXAM:   /70 (BP Location: Left arm, Patient Position: Sitting, Cuff Size: large)   Pulse 53   Temp 97.7 °F (36.5 °C) (Temporal)   Resp 18   Wt 203 lb 9.6 oz (92.4 kg)   SpO2 97%   BMI 37.24 kg/m²  Body mass index is 37.24 kg/m².      GENERAL: well developed, well nourished,in no apparent distress  SKIN: no rashes,no suspicious lesions  HEENT: atraumatic, normocephalic,ears and throat are clear  NECK: supple,no adenopathy,   LUNGS: clear to auscultation  CARDIO: RRR without murmur  GI: good BS's,no masses, HSM or tenderness  EXTREMITIES: no cyanosis, clubbing or edema    ASSESSMENT AND PLAN:     Encounter Diagnosis   Name Primary?    Nonintractable headache, unspecified chronicity pattern, unspecified headache type Yes       Diagnoses and all orders for this visit:    Nonintractable headache, unspecified chronicity pattern, unspecified headache type    CVA-like symptoms, negative for CVA, now feeling just fine.   Will monitor with current meds.   BP's look good at home and here.     No orders of the defined types were placed in this encounter.              Meds & Refills for this Visit:  Requested Prescriptions      No prescriptions requested or ordered in this encounter             The patient indicates understanding of these issues and agrees to the plan.

## 2024-04-29 DIAGNOSIS — I48.0 PAROXYSMAL ATRIAL FIBRILLATION (HCC): ICD-10-CM

## 2024-04-29 RX ORDER — ROSUVASTATIN CALCIUM 5 MG/1
5 TABLET, COATED ORAL NIGHTLY
Qty: 90 TABLET | Refills: 1 | Status: SHIPPED | OUTPATIENT
Start: 2024-04-29

## 2024-04-29 NOTE — TELEPHONE ENCOUNTER
PT CALLED AND ADV IS NEEDS SOME REFILLS  ** [facility] MAIL ORDER **    PT REQUESTING EASY OPEN BOTTLES - ADV HAS ARTHRITIS AND HAS A HARD TIME OPENING UP THE BOTTLES.      apixaban (ELIQUIS) 5 MG Oral Tab     rosuvastatin 5 MG Oral Tab       [facility]IL ORDER     THANK YOU

## 2024-04-29 NOTE — TELEPHONE ENCOUNTER
Last OV 4/22/24  Last lab 4/19/24 cmp (care everywhere), 9/18/23 lipid  Last refilled:  1/25/24 eliquis #180  0 refill  1/25/24 rosuvastatin #90  1 refill

## 2024-06-22 DIAGNOSIS — I10 ESSENTIAL HYPERTENSION, BENIGN: ICD-10-CM

## 2024-06-22 DIAGNOSIS — I48.91 ATRIAL FIBRILLATION WITH TACHYCARDIC VENTRICULAR RATE (HCC): ICD-10-CM

## 2024-06-22 RX ORDER — METOPROLOL SUCCINATE 50 MG/1
50 TABLET, EXTENDED RELEASE ORAL DAILY
Qty: 90 TABLET | Refills: 3 | OUTPATIENT
Start: 2024-06-22

## 2024-06-22 NOTE — TELEPHONE ENCOUNTER
[facility] - [address] AT Portal to Registered [facility] Sites, [phone], [phone]     metoprolol succinate ER 50 MG Oral Tablet 24 Hr     Patient called would like a refill on above medication verified pharmacy     Patient call back # [phone]    Thank you,

## 2024-06-22 NOTE — TELEPHONE ENCOUNTER
Hypertension Medications Protocol [id number]/22/2024 11:35 AM   Protocol Details CMP or BMP in past 12 months    Last BP reading less than 140/90    In person appointment or virtual visit in the past 12 mos or appointment in next 3 mos    EGFRCR or GFRNAA > 50       LOV  3/12/24     Last Refill   Hypertension Medications Protocol [id number]/22/2024 11:35 AM   Protocol Details CMP or BMP in past 12 months    Last BP reading less than 140/90    In person appointment or virtual visit in the past 12 mos or appointment in next 3 mos    EGFRCR or GFRNAA > 50       Labs 4/19/24     Last BP    BP Readings from Last 6 Encounters:   04/22/24 126/70   03/12/24 122/76   03/06/23 110/60   12/23/22 130/70   09/16/22 124/56   07/19/22 152/80       No future appointments.

## 2024-07-09 ENCOUNTER — TELEPHONE (OUTPATIENT)
Dept: FAMILY MEDICINE CLINIC | Facility: CLINIC | Age: 89
End: 2024-07-09

## 2024-07-09 RX ORDER — METOPROLOL SUCCINATE 50 MG/1
50 TABLET, EXTENDED RELEASE ORAL DAILY
Qty: 10 TABLET | Refills: 0 | Status: SHIPPED | OUTPATIENT
Start: 2024-07-09

## 2024-07-09 NOTE — TELEPHONE ENCOUNTER
Spoke with Wilver at [facility] who states patient has 3 refills available of metoprolol  Patient does not have metoprolol on auto refill so will have to call to request when she needs it next.  She will submit order today and should be shipped out in 1-2 days  Can get 10 day supply at local pharmacy    Patient notified and verbalized understanding.   Advised she will have 2 refills left once she received new shipment.  Requests 10 day sent to [facility]  Script sent.

## 2024-07-09 NOTE — TELEPHONE ENCOUNTER
PT CALLED AND [facility] IS OUT OF MEDICATION (2 DAYS OUT)    ADV PT THAT A YEAR SUPPLY WAS SENT TO MAIL ORDER     PT WOULD LIKE TO KNOW IF WE CAN CALL MAIL ORDER AND MAKE  SURE THEY HAVE SCRIPT AND WILL SEND IT.    CAN PT GO W/OUT UNTIL SHE RECEIVES MEDICATION?    PLEASE CALL AND ADV PT TODAY     THANK YOU

## 2024-07-12 ENCOUNTER — OFFICE VISIT (OUTPATIENT)
Dept: FAMILY MEDICINE CLINIC | Facility: CLINIC | Age: 89
End: 2024-07-12
Payer: MEDICARE

## 2024-07-12 VITALS
BODY MASS INDEX: 37 KG/M2 | SYSTOLIC BLOOD PRESSURE: 116 MMHG | RESPIRATION RATE: 18 BRPM | WEIGHT: 202 LBS | DIASTOLIC BLOOD PRESSURE: 72 MMHG | HEART RATE: 57 BPM | OXYGEN SATURATION: 98 % | TEMPERATURE: 97 F

## 2024-07-12 DIAGNOSIS — R60.9 EDEMA, UNSPECIFIED TYPE: Primary | ICD-10-CM

## 2024-07-12 PROCEDURE — 99213 OFFICE O/P EST LOW 20 MIN: CPT | Performed by: FAMILY MEDICINE

## 2024-07-12 NOTE — PROGRESS NOTES
Avis Rios is a 89 year old female.   Chief Complaint   Patient presents with    Swelling     X 1 day      HPI:    Pt walked in to clinic as noticed some more darker color to feet and swelling, painless and once she put her feet up it improved. Pt would like to know if there is anyting concerning    No cp, sob or palpitations, takinger medications that includes statin, beta blocker and blood thinners.   Past Medical History:    Abdominal hernia    Arthritis    Atrial fibrillation (HCC)    Back pain    Breast cancer (HCC)    CAD (coronary artery disease)    Easy bruising    High cholesterol    HTN (hypertension)    Itch of skin    Leaking of urine    Pain in joints    Stented coronary artery    Wears glasses     Past Surgical History:   Procedure Laterality Date    Angiogram  2012    stent to RCA    Appendectomy  1955    Appendectomy      Cataract Bilateral     Hysterectomy  1992    Knee arthroscopy Left 1997    Lumpectomy right      Lysis of adhesions  1958     Family History   Problem Relation Age of Onset    Asthma Mother         later age onset    Heart Disease Mother     Other (congestive heart failure) Father     Other (multiple myeloma) Sister 60    Other (rheumatoid arthritis) Sister     Breast Cancer Self      Social History:  Social History     Socioeconomic History    Marital status:    Tobacco Use    Smoking status: Former     Current packs/day: 0.50     Average packs/day: 0.5 packs/day for 30.0 years (15.0 ttl pk-yrs)     Types: Cigarettes    Smokeless tobacco: Former    Tobacco comments:     quit 30 yrs ago   Vaping Use    Vaping status: Never Used   Substance and Sexual Activity    Alcohol use: Yes     Alcohol/week: 2.0 standard drinks of alcohol     Types: 2 Glasses of wine per week     Comment: sometime daily    Drug use: Never     Social Determinants of Health     Food Insecurity: No Food Insecurity (4/20/2024)    Received from [facility]    Food Insecurity      Currently or in the past 3 months, have you worried your food would run out before you had money to buy more?: No     In the past 12 months, have you run out of food or been unable to get more?: No   Transportation Needs: No Transportation Needs (4/20/2024)    Received from [facility]    Transportation Needs     Medical Transportation Needs?: No    Received from [facility], [facility]    Social Connections    Received from [facility], [facility]    Housing Stability     Allergies:  Allergies   Allergen Reactions    Lipitor [Atorvastatin] ITCHING      Current Meds:  Current Outpatient Medications   Medication Sig Dispense Refill    apixaban (ELIQUIS) 5 MG Oral Tab Take 1 tablet (5 mg total) by mouth 2 (two) times daily. 180 tablet 0    rosuvastatin 5 MG Oral Tab Take 1 tablet (5 mg total) by mouth nightly. 90 tablet 1    metoprolol succinate ER 50 MG Oral Tablet 24 Hr Take 1 tablet (50 mg total) by mouth daily. 90 tablet 3    metoprolol succinate ER 50 MG Oral Tablet 24 Hr Take 1 tablet (50 mg total) by mouth daily. (Patient not taking: Reported on 7/12/2024) 10 tablet 0    omeprazole 20 MG Oral Capsule Delayed Release Take 1 capsule (20 mg total) by mouth every morning before breakfast. (Patient not taking: Reported on 7/12/2024) 90 capsule 0    Multiple Vitamins-Minerals (CENTRUM SILVER) Oral Tab Take 1 tablet by mouth daily. (Patient not taking: Reported on 3/12/2024)          ROS:   GENERAL HEALTH: feels well otherwise  SKIN: see hpi  RESPIRATORY: denies shortness of breath with exertion  CARDIOVASCULAR: denies chest pain on exertion  GI: denies abdominal pain and denies heartburn  NEURO: denies headaches    PHYSICAL EXAM:   /72 (BP Location: Left arm, Patient Position: Sitting, Cuff Size: large)   Pulse 57   Temp 97 °F (36.1 °C) (Temporal)   Resp 18   Wt 202 lb (91.6 kg)   SpO2 98%   BMI 36.95 kg/m²    GENERAL HEALTH: well developed, well nourished, in no apparent distress  EYES: sclera anicteric, conjunctiva normal  HEENT: normocephalic; normal pharynx  NECK: supple; no JVD, no LAD  RESPIRATORY: clear to auscultation bilaterally, no tachypnea  CARDIOVASCULAR: S1, S2 normal, no S3, no S4; no click; no murmur  EXTREMITIES: mild edema w marked purple vascularity, blanhing, good pulses and good capilary refill  PSYCHIATRIC: alert and oriented x 3; affect appropriate      ASSESSMENT/ PLAN:     Diagnoses and all orders for this visit:    Edema, unspecified type    Dependent edema, non pain no ulcers, reassued, may use compression stalikngs and follow up w PCP prn    The patient is to return to office in prn  The patient is to return to office for persistent or worsening signs and symptoms.   The proper use of medication and possible side effects discussed with patient.  An AVS was given to patient.  The patient verbalized understanding, agrees to treatment regimen and all questions were answered.

## 2024-07-29 DIAGNOSIS — I48.0 PAROXYSMAL ATRIAL FIBRILLATION (HCC): ICD-10-CM

## 2024-07-29 NOTE — TELEPHONE ENCOUNTER
Patient calling to request refill of apixaban (ELIQUIS) 5 MG Oral Tab   [facility] [address] - [address] AT [address]AL TO [facility] S[address], [phone], [phone] [[id number]]

## 2024-09-21 DIAGNOSIS — I10 ESSENTIAL HYPERTENSION, BENIGN: ICD-10-CM

## 2024-09-21 DIAGNOSIS — I48.91 ATRIAL FIBRILLATION WITH TACHYCARDIC VENTRICULAR RATE (HCC): ICD-10-CM

## 2024-09-21 RX ORDER — METOPROLOL SUCCINATE 50 MG/1
50 TABLET, EXTENDED RELEASE ORAL DAILY
Qty: 90 TABLET | Refills: 0 | Status: SHIPPED | OUTPATIENT
Start: 2024-09-21

## 2024-09-21 NOTE — TELEPHONE ENCOUNTER
Patient requests refill      metoprolol succinate ER 50 MG Oral Tablet 24 Hr     [facility] mail order

## 2024-09-21 NOTE — TELEPHONE ENCOUNTER
Last OV 4/22/24 7/12/24  /72  Last lab 4/19/24 cmp (care everywhere)  Last refilled 1/25/24  #90  3 refill

## 2024-10-08 RX ORDER — ROSUVASTATIN CALCIUM 5 MG/1
5 TABLET, COATED ORAL NIGHTLY
Qty: 90 TABLET | Refills: 1 | Status: SHIPPED | OUTPATIENT
Start: 2024-10-08

## 2024-10-08 NOTE — TELEPHONE ENCOUNTER
Cholesterol Medication Protocol Wasdxc99/08/2024 07:09 AM   Protocol Details ALT < 80    ALT resulted within past year    Lipid panel within past 12 months    In person appointment or virtual visit in the past 12 mos or appointment in next 3 mos       LOV 4/22/24     Last Refill   Medication Quantity Refills Start End   rosuvastatin 5 MG Oral Tab 90 tablet 1 4/29/2024        Labs 4/19/24    No future appointments.

## 2024-11-01 DIAGNOSIS — I48.0 PAROXYSMAL ATRIAL FIBRILLATION (HCC): ICD-10-CM

## 2024-11-01 NOTE — TELEPHONE ENCOUNTER
Last OV:04/22/2024 KE, 07/12/2024 AG  Last refill:07/29/2024, 180 tabs, 1 refill   Last labs 04/19/2024  Medication pended, please sign if appropriate

## 2024-11-01 NOTE — TELEPHONE ENCOUNTER
PT CALLED AND ADV NEEDS REFILL OF     apixaban (ELIQUIS) 5 MG Oral Tab     PLEASE SEND TO     [facility] MAIL ORDER    THANK YOU

## 2024-12-16 ENCOUNTER — TELEPHONE (OUTPATIENT)
Dept: FAMILY MEDICINE CLINIC | Facility: CLINIC | Age: 89
End: 2024-12-16

## 2024-12-16 NOTE — TELEPHONE ENCOUNTER
Patient's son calling regarding referral for patient to get PT, pain management, and a bath aide with [facility].     Requesting referral be faxed to [facility] with Attention: Pierre    Fax: [phone]    Patient has upcoming appointment.     Future Appointments   Date Time Provider Department Center   12/17/2024  9:15 AM Piedad Boothe DO [facility] [place]

## 2024-12-17 ENCOUNTER — OFFICE VISIT (OUTPATIENT)
Dept: FAMILY MEDICINE CLINIC | Facility: CLINIC | Age: 89
End: 2024-12-17
Payer: MEDICARE

## 2024-12-17 VITALS
RESPIRATION RATE: 18 BRPM | HEART RATE: 61 BPM | TEMPERATURE: 97 F | BODY MASS INDEX: 37 KG/M2 | WEIGHT: 204.38 LBS | OXYGEN SATURATION: 98 % | SYSTOLIC BLOOD PRESSURE: 136 MMHG | DIASTOLIC BLOOD PRESSURE: 70 MMHG

## 2024-12-17 DIAGNOSIS — M54.41 ACUTE RIGHT-SIDED LOW BACK PAIN WITH RIGHT-SIDED SCIATICA: Primary | ICD-10-CM

## 2024-12-17 DIAGNOSIS — M62.830 MUSCLE SPASM OF BACK: ICD-10-CM

## 2024-12-17 DIAGNOSIS — C50.311 MALIGNANT NEOPLASM OF LOWER-INNER QUADRANT OF RIGHT BREAST OF FEMALE, ESTROGEN RECEPTOR POSITIVE (HCC): ICD-10-CM

## 2024-12-17 DIAGNOSIS — Z17.0 MALIGNANT NEOPLASM OF LOWER-INNER QUADRANT OF RIGHT BREAST OF FEMALE, ESTROGEN RECEPTOR POSITIVE (HCC): ICD-10-CM

## 2024-12-17 PROBLEM — E66.01 SEVERE OBESITY (BMI 35.0-39.9) WITH COMORBIDITY (HCC): Chronic | Status: ACTIVE | Noted: 2024-12-17

## 2024-12-17 PROCEDURE — 99214 OFFICE O/P EST MOD 30 MIN: CPT | Performed by: FAMILY MEDICINE

## 2024-12-17 PROCEDURE — 1111F DSCHRG MED/CURRENT MED MERGE: CPT | Performed by: FAMILY MEDICINE

## 2024-12-17 PROCEDURE — 1159F MED LIST DOCD IN RCRD: CPT | Performed by: FAMILY MEDICINE

## 2024-12-17 PROCEDURE — 1170F FXNL STATUS ASSESSED: CPT | Performed by: FAMILY MEDICINE

## 2024-12-17 RX ORDER — CYCLOBENZAPRINE HCL 10 MG
10 TABLET ORAL 3 TIMES DAILY PRN
COMMUNITY
Start: 2024-12-15

## 2024-12-17 RX ORDER — HYDROCODONE BITARTRATE AND ACETAMINOPHEN 5; 325 MG/1; MG/1
1 TABLET ORAL EVERY 6 HOURS PRN
COMMUNITY
Start: 2024-12-15

## 2024-12-17 NOTE — TELEPHONE ENCOUNTER
Referral for home health placed. Note finished. Please fax to home health.     [facility] with Attention: Pierre     Fax: [phone]

## 2024-12-17 NOTE — PROGRESS NOTES
Avis Rios is a 89 year old female.  Chief Complaint   Patient presents with    Follow - Up     ER follow up        HPI:   Friday she was out shopping and suddenly got a pain in leg.   The next day the pain was so bad she could barely walk.   No numbness. No falling.   Pain in right buttock and down the right leg.   No bladder or bowel incontinence.   Has no had a BM since Thursday.   She got some miralax yesterday.   She needs help getting into bed, hard to lift her right leg up due to pain.   She currently cannot reach back and wipe herself.   She has a bath without a shower chair.   Before this weekend, she has been driving herself around, but currently cannot get out of the house on her own.   She is walking slowly with a walker.     Her son and daughter-in-law are here with her today.     ER put a lidocaine patch, gave her muscle relaxer and norco. Taking norco every 6 hours, starting to wean down.     They would like home health for PT and bath aid.     ALLERGIES:  Allergies[1]      Current Outpatient Medications   Medication Sig Dispense Refill    HYDROcodone-acetaminophen 5-325 MG Oral Tab Take 1 tablet by mouth every 6 (six) hours as needed.      cyclobenzaprine 10 MG Oral Tab Take 1 tablet (10 mg total) by mouth 3 (three) times daily as needed.      apixaban (ELIQUIS) 5 MG Oral Tab Take 1 tablet (5 mg total) by mouth 2 (two) times daily. 180 tablet 1    ROSUVASTATIN 5 MG Oral Tab TAKE 1 TABLET NIGHTLY 90 tablet 1    metoprolol succinate ER 50 MG Oral Tablet 24 Hr Take 1 tablet (50 mg total) by mouth daily. 90 tablet 0    metoprolol succinate ER 50 MG Oral Tablet 24 Hr Take 1 tablet (50 mg total) by mouth daily. (Patient not taking: Reported on 12/17/2024) 10 tablet 0    omeprazole 20 MG Oral Capsule Delayed Release Take 1 capsule (20 mg total) by mouth every morning before breakfast. (Patient not taking: Reported on 7/12/2024) 90 capsule 0    Multiple Vitamins-Minerals (CENTRUM SILVER) Oral Tab Take 1  tablet by mouth daily. (Patient not taking: Reported on 3/12/2024)        Past Medical History:    Abdominal hernia    Arthritis    Atrial fibrillation (HCC)    Back pain    Breast cancer (HCC)    CAD (coronary artery disease)    Easy bruising    High cholesterol    HTN (hypertension)    Itch of skin    Leaking of urine    Pain in joints    Stented coronary artery    Wears glasses      Social History:  Social History     Socioeconomic History    Marital status:    Tobacco Use    Smoking status: Former     Current packs/day: 0.50     Average packs/day: 0.5 packs/day for 30.0 years (15.0 ttl pk-yrs)     Types: Cigarettes    Smokeless tobacco: Former    Tobacco comments:     quit 30 yrs ago   Vaping Use    Vaping status: Never Used   Substance and Sexual Activity    Alcohol use: Yes     Alcohol/week: 2.0 standard drinks of alcohol     Types: 2 Glasses of wine per week     Comment: sometime daily    Drug use: Never     Social Drivers of Health     Food Insecurity: No Food Insecurity (4/20/2024)    Received from [facility]    Food Insecurity     Currently or in the past 3 months, have you worried your food would run out before you had money to buy more?: No     In the past 12 months, have you run out of food or been unable to get more?: No   Transportation Needs: No Transportation Needs (4/20/2024)    Received from [facility]    Transportation Needs     Currently or in the past 3 months, has lack of transportation kept you from medical appointments, getting food or medicine, or providing care to a family member?: Unrecognized value     Medical Transportation Needs?: No    Received from [facility], [facility]    Social Connections    Received from [facility], [facility]    Housing Stability        BP Readings from Last 6 Encounters:   12/17/24 136/70   07/12/24 116/72   04/22/24 126/70   03/12/24  122/76   03/06/23 110/60   12/23/22 130/70       Wt Readings from Last 6 Encounters:   12/17/24 204 lb 6.4 oz (92.7 kg)   07/12/24 202 lb (91.6 kg)   04/22/24 203 lb 9.6 oz (92.4 kg)   03/12/24 203 lb (92.1 kg)   03/06/23 205 lb (93 kg)   12/23/22 199 lb (90.3 kg)       REVIEW OF SYSTEMS:   GENERAL HEALTH: feels well no complaints other than above   SKIN: denies any unusual skin lesions or rashes  RESPIRATORY: denies shortness of breath with exertion  CARDIOVASCULAR: denies chest pain on exertion  GI: denies abdominal pain and denies heartburn  NEURO: denies headaches  Musc: see HPI      EXAM:   /70   Pulse 61   Temp 97.2 °F (36.2 °C)   Resp 18   Wt 204 lb 6.4 oz (92.7 kg)   SpO2 98%   BMI 37.39 kg/m²  Body mass index is 37.39 kg/m².      GENERAL: well developed, well nourished,in no apparent distress, walking slowly with a walker, hunched forward.   SKIN: no rashes,no suspicious lesions  HEENT: atraumatic, normocephalic, mucous membranes moist.   NECK: supple,no adenopathy,   LUNGS: clear to auscultation, no rales or wheezing   CARDIO: RRR without murmur  GI: good BS's,no masses, HSM or tenderness  EXTREMITIES: no cyanosis, clubbing or edema  Musc: + tenderness in right lower back and down into buttocks.     ASSESSMENT AND PLAN:     Encounter Diagnoses   Name Primary?    Acute right-sided low back pain with right-sided sciatica Yes    Muscle spasm of back        Diagnoses and all orders for this visit:    Acute right-sided low back pain with right-sided sciatica  -     Home Health Referral - External    Muscle spasm of back  -     Home Health Referral - External    She is getting better slowly.   Hopefully this is just a spasm that will get better in the next week or so.   Continue meds for now, wean down norco as tolerated.   Keep moving, walk as tolerated, gentle stretching.   Will get home health to come out and help with PT.     She is currently homebound due to her back pain. She cannot get out of  the house without assistance.   She needs help at home with getting into bed, showering, toileting.   A face-to-face visit was done today and these needs have been identified.     No orders of the defined types were placed in this encounter.              Meds & Refills for this Visit:  Requested Prescriptions      No prescriptions requested or ordered in this encounter             The patient indicates understanding of these issues and agrees to the plan.               [1]   Allergies  Allergen Reactions    Lipitor [Atorvastatin] ITCHING

## 2024-12-20 ENCOUNTER — TELEPHONE (OUTPATIENT)
Dept: FAMILY MEDICINE CLINIC | Facility: CLINIC | Age: 89
End: 2024-12-20

## 2024-12-20 NOTE — TELEPHONE ENCOUNTER
Pt states that she was told she may need therapy by Dr. Boothe at her last visit.  A friend recommended a therapist for her - [employer].  She did exercises today and therapist said she did great and only needs to be seen once a week for a short time.

## 2024-12-27 NOTE — TELEPHONE ENCOUNTER
Called patient to follow up on message.  Per patient nothing further needed she is doing PT at home.    Patient also asking what she can take for acid reflux.  States she is heading out to [facility] and wants to pick something up.  Advised patient Dr Boothe precribed omeprazole 20mg last January.  Patient does not recall getting that prescription. Is aware med is OTC and will  today. Advised to let Dr Boothe know how that works

## 2025-01-06 DIAGNOSIS — I10 ESSENTIAL HYPERTENSION, BENIGN: ICD-10-CM

## 2025-01-06 DIAGNOSIS — I48.91 ATRIAL FIBRILLATION WITH TACHYCARDIC VENTRICULAR RATE (HCC): ICD-10-CM

## 2025-01-06 RX ORDER — METOPROLOL SUCCINATE 50 MG/1
50 TABLET, EXTENDED RELEASE ORAL DAILY
Qty: 90 TABLET | Refills: 0 | Status: SHIPPED | OUTPATIENT
Start: 2025-01-06

## 2025-01-06 NOTE — TELEPHONE ENCOUNTER
Hypertension Medications Protocol [id number]/06/2025 07:09 AM   Protocol Details CMP or BMP in past 12 months    EGFRCR or GFRNAA > 50    Last BP reading less than 140/90    In person appointment or virtual visit in the past 12 mos or appointment in next 3 mos          Last refill:   metoprolol succinate ER 50 MG Oral Tablet 24 Hr 90 tablet 0 9/21/2024     Last Visit: 12/17/24    Next Visit: No future appointments.    Labs: 4/19/24    Forward to Dr. Boothe please advise on refills. Thanks.

## 2025-01-17 ENCOUNTER — TELEPHONE (OUTPATIENT)
Dept: FAMILY MEDICINE CLINIC | Facility: CLINIC | Age: 89
End: 2025-01-17

## 2025-01-17 NOTE — TELEPHONE ENCOUNTER
HOME HEALTH CALLED AND ADV THAT PT BEING DISCHARGED FROM HOME HEALTH SERVICES WITH GOALS BEING MET.    WILL FAX OVER PAPER WORK.    THANK YOU

## 2025-01-20 ENCOUNTER — MED REC SCAN ONLY (OUTPATIENT)
Dept: FAMILY MEDICINE CLINIC | Facility: CLINIC | Age: 89
End: 2025-01-20

## 2025-01-22 ENCOUNTER — HOME HEALTH CHARGES (OUTPATIENT)
Dept: FAMILY MEDICINE CLINIC | Facility: CLINIC | Age: 89
End: 2025-01-22

## 2025-01-22 DIAGNOSIS — M54.41 LOW BACK PAIN WITH RIGHT-SIDED SCIATICA, UNSPECIFIED BACK PAIN LATERALITY, UNSPECIFIED CHRONICITY: Primary | ICD-10-CM

## 2025-01-28 ENCOUNTER — OFFICE VISIT (OUTPATIENT)
Dept: FAMILY MEDICINE CLINIC | Facility: CLINIC | Age: 89
End: 2025-01-28
Payer: MEDICARE

## 2025-01-28 VITALS
TEMPERATURE: 97 F | WEIGHT: 198.63 LBS | SYSTOLIC BLOOD PRESSURE: 130 MMHG | BODY MASS INDEX: 36 KG/M2 | DIASTOLIC BLOOD PRESSURE: 86 MMHG | HEART RATE: 86 BPM | RESPIRATION RATE: 20 BRPM | OXYGEN SATURATION: 98 %

## 2025-01-28 DIAGNOSIS — K21.9 GASTROESOPHAGEAL REFLUX DISEASE, UNSPECIFIED WHETHER ESOPHAGITIS PRESENT: ICD-10-CM

## 2025-01-28 DIAGNOSIS — R07.9 CHEST PAIN, UNSPECIFIED TYPE: Primary | ICD-10-CM

## 2025-01-28 LAB
ATRIAL RATE: 62 BPM
P AXIS: 53 DEGREES
P-R INTERVAL: 148 MS
Q-T INTERVAL: 416 MS
QRS DURATION: 74 MS
QTC CALCULATION (BEZET): 422 MS
R AXIS: -4 DEGREES
T AXIS: 52 DEGREES
VENTRICULAR RATE: 62 BPM

## 2025-01-28 PROCEDURE — 93000 ELECTROCARDIOGRAM COMPLETE: CPT | Performed by: FAMILY MEDICINE

## 2025-01-28 PROCEDURE — 99214 OFFICE O/P EST MOD 30 MIN: CPT | Performed by: FAMILY MEDICINE

## 2025-01-28 RX ORDER — OMEPRAZOLE 40 MG/1
40 CAPSULE, DELAYED RELEASE ORAL DAILY
Qty: 90 CAPSULE | Refills: 0 | Status: SHIPPED | OUTPATIENT
Start: 2025-01-28 | End: 2026-01-23

## 2025-01-28 NOTE — PROGRESS NOTES
Avis Rios is a 89 year old female.  Chief Complaint   Patient presents with    Gas    Belching     X 1 day        HPI:   Got in bed last night. Was awake. Got a sudden pain while laying in bed, in chest. No exertion.   Heart was beating regularly, did not feel fast or irregular.   It was enough to keep her awake.   Felt like gas pain. Burping didn't help.   She took an aspirin, that didn't help.   She drank some soda, burped a couple of times and it faded away.   Pain was behind left breast, no radiation to jaw, arm, or anywhere.   It did not feel like when she had her heart attack in 2012.   No shortness of breath.   It lasted 1 - 1.5 hr.   Following with cardiology, will see him in March.     Today she changed her bed sheets, did 2 loads of laundry, dusted, and feels fine.   Trying to eat healthier.    Back is feeling better.     ALLERGIES:  Allergies[1]      Current Outpatient Medications   Medication Sig Dispense Refill    Omeprazole 40 MG Oral Capsule Delayed Release Take 1 capsule (40 mg total) by mouth daily. 90 capsule 0    METOPROLOL SUCCINATE ER 50 MG Oral Tablet 24 Hr TAKE 1 TABLET DAILY 90 tablet 0    apixaban (ELIQUIS) 5 MG Oral Tab Take 1 tablet (5 mg total) by mouth 2 (two) times daily. 180 tablet 1    ROSUVASTATIN 5 MG Oral Tab TAKE 1 TABLET NIGHTLY 90 tablet 1    HYDROcodone-acetaminophen 5-325 MG Oral Tab Take 1 tablet by mouth every 6 (six) hours as needed. (Patient not taking: Reported on 1/28/2025)      cyclobenzaprine 10 MG Oral Tab Take 1 tablet (10 mg total) by mouth 3 (three) times daily as needed. (Patient not taking: Reported on 1/28/2025)      metoprolol succinate ER 50 MG Oral Tablet 24 Hr Take 1 tablet (50 mg total) by mouth daily. (Patient not taking: Reported on 7/12/2024) 10 tablet 0    omeprazole 20 MG Oral Capsule Delayed Release Take 1 capsule (20 mg total) by mouth every morning before breakfast. (Patient not taking: Reported on 1/28/2025) 90 capsule 0    Multiple  Vitamins-Minerals (CENTRUM SILVER) Oral Tab Take 1 tablet by mouth daily. (Patient not taking: Reported on 3/12/2024)        Past Medical History:    Abdominal hernia    Arthritis    Atrial fibrillation (HCC)    Back pain    Breast cancer (HCC)    CAD (coronary artery disease)    Easy bruising    High cholesterol    HTN (hypertension)    Itch of skin    Leaking of urine    Pain in joints    Stented coronary artery    Wears glasses      Social History:  Social History     Socioeconomic History    Marital status:    Tobacco Use    Smoking status: Former     Current packs/day: 0.50     Average packs/day: 0.5 packs/day for 30.0 years (15.0 ttl pk-yrs)     Types: Cigarettes    Smokeless tobacco: Former    Tobacco comments:     quit 30 yrs ago   Vaping Use    Vaping status: Never Used   Substance and Sexual Activity    Alcohol use: Yes     Alcohol/week: 2.0 standard drinks of alcohol     Types: 2 Glasses of wine per week     Comment: sometime daily    Drug use: Never     Social Drivers of Health     Food Insecurity: No Food Insecurity (1/28/2025)    [facility] - Food Insecurity     Worried About Running Out of Food in the Last Year: No     Ran Out of Food in the Last Year: No   Transportation Needs: No Transportation Needs (1/28/2025)    [facility]SS - Transportation     Lack of Transportation: No    [facility], [facility]    Social Connections   Housing Stability: Not At Risk (1/28/2025)    [facility]SS - Housing/Utilities     Has Housing: Yes     Worried About Losing Housing: No     Unable to Get Utilities: No        BP Readings from Last 6 Encounters:   01/28/25 130/86   12/17/24 136/70   07/12/24 116/72   04/22/24 126/70   03/12/24 122/76   03/06/23 110/60       Wt Readings from Last 6 Encounters:   01/28/25 198 lb 9.6 oz (90.1 kg)   12/17/24 204 lb 6.4 oz (92.7 kg)   07/12/24 202 lb (91.6 kg)   04/22/24 203 lb 9.6 oz (92.4 kg)   03/12/24 203 lb (92.1 kg)   03/06/23 205 lb (93  kg)       REVIEW OF SYSTEMS:   GENERAL HEALTH: feels well no complaints other than above   SKIN: denies any unusual skin lesions or rashes  RESPIRATORY: denies shortness of breath with exertion  CARDIOVASCULAR: denies chest pain on exertion, see HPI   GI: denies abdominal pain and denies heartburn  NEURO: denies headaches    EXAM:   /86   Pulse 86   Temp 97.4 °F (36.3 °C) (Temporal)   Resp 20   Wt 198 lb 9.6 oz (90.1 kg)   SpO2 98%   BMI 36.32 kg/m²  Body mass index is 36.32 kg/m².      GENERAL: well developed, well nourished,in no apparent distress  SKIN: no rashes,no suspicious lesions  HEENT: atraumatic, normocephalic,   NECK: supple,no adenopathy,   LUNGS: clear to auscultation  Chest: no chest wall pain or tenderness to palpation   CARDIO: RRR without murmur  GI: good BS's,no masses, HSM or tenderness  EXTREMITIES: no cyanosis, clubbing or edema    ASSESSMENT AND PLAN:     Encounter Diagnoses   Name Primary?    Chest pain, unspecified type Yes    Gastroesophageal reflux disease, unspecified whether esophagitis present        Diagnoses and all orders for this visit:    Chest pain, unspecified type  -     EKG with interpretation and Report -IN OFFICE [[id number]]    Gastroesophageal reflux disease, unspecified whether esophagitis present  -     Omeprazole 40 MG Oral Capsule Delayed Release; Take 1 capsule (40 mg total) by mouth daily.    EKG shows NSR, no arrhythmia or ST changes. Recent MI is very unlikely.   Suspect GERD. Will start her back on PPI for a month or two to calm it down.   Follow up with cardiology as scheduled and for AWV as scheduled.     No orders of the defined types were placed in this encounter.              Meds & Refills for this Visit:  Requested Prescriptions     Signed Prescriptions Disp Refills    Omeprazole 40 MG Oral Capsule Delayed Release 90 capsule 0     Sig: Take 1 capsule (40 mg total) by mouth daily.             The patient indicates understanding of these issues and  agrees to the plan.               [1]   Allergies  Allergen Reactions    Lipitor [Atorvastatin] ITCHING

## 2025-02-24 PROBLEM — C50.311 MALIGNANT NEOPLASM OF LOWER-INNER QUADRANT OF RIGHT BREAST OF FEMALE, ESTROGEN RECEPTOR POSITIVE (HCC): Status: RESOLVED | Noted: 2024-12-17 | Resolved: 2025-02-24

## 2025-02-24 PROBLEM — Z17.0 MALIGNANT NEOPLASM OF LOWER-INNER QUADRANT OF RIGHT BREAST OF FEMALE, ESTROGEN RECEPTOR POSITIVE (HCC): Status: RESOLVED | Noted: 2024-12-17 | Resolved: 2025-02-24

## 2025-02-24 PROBLEM — Z85.3 HISTORY OF BREAST CANCER: Status: ACTIVE | Noted: 2025-02-24

## 2025-03-03 ENCOUNTER — OFFICE VISIT (OUTPATIENT)
Dept: FAMILY MEDICINE CLINIC | Facility: CLINIC | Age: 89
End: 2025-03-03
Payer: MEDICARE

## 2025-03-03 VITALS
DIASTOLIC BLOOD PRESSURE: 70 MMHG | BODY MASS INDEX: 36 KG/M2 | SYSTOLIC BLOOD PRESSURE: 130 MMHG | TEMPERATURE: 97 F | WEIGHT: 198.19 LBS | HEART RATE: 76 BPM | OXYGEN SATURATION: 97 % | RESPIRATION RATE: 19 BRPM

## 2025-03-03 DIAGNOSIS — R73.9 HYPERGLYCEMIA: ICD-10-CM

## 2025-03-03 DIAGNOSIS — E66.01 SEVERE OBESITY (BMI 35.0-39.9) WITH COMORBIDITY (HCC): Chronic | ICD-10-CM

## 2025-03-03 DIAGNOSIS — R01.1 MURMUR: ICD-10-CM

## 2025-03-03 DIAGNOSIS — Z95.5 H/O HEART ARTERY STENT: ICD-10-CM

## 2025-03-03 DIAGNOSIS — Z79.899 ON STATIN THERAPY DUE TO RISK OF FUTURE CARDIOVASCULAR EVENT: ICD-10-CM

## 2025-03-03 DIAGNOSIS — J84.10 PULMONARY FIBROSIS, UNSPECIFIED (HCC): ICD-10-CM

## 2025-03-03 DIAGNOSIS — I10 ESSENTIAL HYPERTENSION, BENIGN: ICD-10-CM

## 2025-03-03 DIAGNOSIS — I87.2 VENOUS INSUFFICIENCY (CHRONIC) (PERIPHERAL): ICD-10-CM

## 2025-03-03 DIAGNOSIS — J98.4 CALCIFIED GRANULOMA OF LUNG: ICD-10-CM

## 2025-03-03 DIAGNOSIS — I48.0 PAROXYSMAL ATRIAL FIBRILLATION (HCC): ICD-10-CM

## 2025-03-03 DIAGNOSIS — I25.10 CORONARY ARTERY DISEASE INVOLVING NATIVE CORONARY ARTERY OF NATIVE HEART WITHOUT ANGINA PECTORIS: ICD-10-CM

## 2025-03-03 DIAGNOSIS — R31.1 BENIGN ESSENTIAL MICROSCOPIC HEMATURIA: ICD-10-CM

## 2025-03-03 DIAGNOSIS — I51.89 DIASTOLIC DYSFUNCTION: ICD-10-CM

## 2025-03-03 DIAGNOSIS — Z85.3 HISTORY OF BREAST CANCER: ICD-10-CM

## 2025-03-03 DIAGNOSIS — Z00.00 ENCOUNTER FOR ANNUAL HEALTH EXAMINATION: Primary | ICD-10-CM

## 2025-03-03 DIAGNOSIS — I25.2 H/O ACUTE MYOCARDIAL INFARCTION: ICD-10-CM

## 2025-03-03 DIAGNOSIS — K21.9 GASTROESOPHAGEAL REFLUX DISEASE WITHOUT ESOPHAGITIS: ICD-10-CM

## 2025-03-03 DIAGNOSIS — Z95.5 PRESENCE OF CORONARY ANGIOPLASTY IMPLANT AND GRAFT: ICD-10-CM

## 2025-03-03 LAB
ALBUMIN SERPL-MCNC: 4.1 G/DL (ref 3.2–4.8)
ALBUMIN/GLOB SERPL: 1.8 {RATIO} (ref 1–2)
ALP LIVER SERPL-CCNC: 55 U/L
ALT SERPL-CCNC: 12 U/L
ANION GAP SERPL CALC-SCNC: 4 MMOL/L (ref 0–18)
AST SERPL-CCNC: 17 U/L (ref ?–34)
BASOPHILS # BLD AUTO: 0.04 X10(3) UL (ref 0–0.2)
BASOPHILS NFR BLD AUTO: 0.8 %
BILIRUB SERPL-MCNC: 1.5 MG/DL (ref 0.2–1.1)
BILIRUB UR QL STRIP.AUTO: NEGATIVE
BUN BLD-MCNC: 13 MG/DL (ref 9–23)
CALCIUM BLD-MCNC: 9.9 MG/DL (ref 8.7–10.6)
CHLORIDE SERPL-SCNC: 103 MMOL/L (ref 98–112)
CHOLEST SERPL-MCNC: 134 MG/DL (ref ?–200)
CLARITY UR REFRACT.AUTO: CLEAR
CO2 SERPL-SCNC: 32 MMOL/L (ref 21–32)
CREAT BLD-MCNC: 0.94 MG/DL
EGFRCR SERPLBLD CKD-EPI 2021: 59 ML/MIN/1.73M2 (ref 60–?)
EOSINOPHIL # BLD AUTO: 0.1 X10(3) UL (ref 0–0.7)
EOSINOPHIL NFR BLD AUTO: 2.1 %
ERYTHROCYTE [DISTWIDTH] IN BLOOD BY AUTOMATED COUNT: 13.7 %
EST. AVERAGE GLUCOSE BLD GHB EST-MCNC: 105 MG/DL (ref 68–126)
FASTING PATIENT LIPID ANSWER: NO
FASTING STATUS PATIENT QL REPORTED: NO
GLOBULIN PLAS-MCNC: 2.3 G/DL (ref 2–3.5)
GLUCOSE BLD-MCNC: 101 MG/DL (ref 70–99)
GLUCOSE UR STRIP.AUTO-MCNC: NORMAL MG/DL
HBA1C MFR BLD: 5.3 % (ref ?–5.7)
HCT VFR BLD AUTO: 40.8 %
HDLC SERPL-MCNC: 55 MG/DL (ref 40–59)
HGB BLD-MCNC: 13.5 G/DL
IMM GRANULOCYTES # BLD AUTO: 0.01 X10(3) UL (ref 0–1)
IMM GRANULOCYTES NFR BLD: 0.2 %
KETONES UR STRIP.AUTO-MCNC: NEGATIVE MG/DL
LDLC SERPL CALC-MCNC: 60 MG/DL (ref ?–100)
LEUKOCYTE ESTERASE UR QL STRIP.AUTO: NEGATIVE
LYMPHOCYTES # BLD AUTO: 1.03 X10(3) UL (ref 1–4)
LYMPHOCYTES NFR BLD AUTO: 21.8 %
MCH RBC QN AUTO: 31 PG (ref 26–34)
MCHC RBC AUTO-ENTMCNC: 33.1 G/DL (ref 31–37)
MCV RBC AUTO: 93.8 FL
MONOCYTES # BLD AUTO: 0.47 X10(3) UL (ref 0.1–1)
MONOCYTES NFR BLD AUTO: 10 %
NEUTROPHILS # BLD AUTO: 3.07 X10 (3) UL (ref 1.5–7.7)
NEUTROPHILS # BLD AUTO: 3.07 X10(3) UL (ref 1.5–7.7)
NEUTROPHILS NFR BLD AUTO: 65.1 %
NITRITE UR QL STRIP.AUTO: NEGATIVE
NONHDLC SERPL-MCNC: 79 MG/DL (ref ?–130)
OSMOLALITY SERPL CALC.SUM OF ELEC: 288 MOSM/KG (ref 275–295)
PH UR STRIP.AUTO: 5.5 [PH] (ref 5–8)
PLATELET # BLD AUTO: 155 10(3)UL (ref 150–450)
POTASSIUM SERPL-SCNC: 4.3 MMOL/L (ref 3.5–5.1)
PROT SERPL-MCNC: 6.4 G/DL (ref 5.7–8.2)
PROT UR STRIP.AUTO-MCNC: NEGATIVE MG/DL
RBC # BLD AUTO: 4.35 X10(6)UL
SODIUM SERPL-SCNC: 139 MMOL/L (ref 136–145)
SP GR UR STRIP.AUTO: 1.01 (ref 1–1.03)
TRIGL SERPL-MCNC: 103 MG/DL (ref 30–149)
UROBILINOGEN UR STRIP.AUTO-MCNC: NORMAL MG/DL
VLDLC SERPL CALC-MCNC: 15 MG/DL (ref 0–30)
WBC # BLD AUTO: 4.7 X10(3) UL (ref 4–11)

## 2025-03-03 PROCEDURE — 83036 HEMOGLOBIN GLYCOSYLATED A1C: CPT | Performed by: FAMILY MEDICINE

## 2025-03-03 PROCEDURE — 81001 URINALYSIS AUTO W/SCOPE: CPT | Performed by: FAMILY MEDICINE

## 2025-03-03 PROCEDURE — 85025 COMPLETE CBC W/AUTO DIFF WBC: CPT | Performed by: FAMILY MEDICINE

## 2025-03-03 PROCEDURE — 99499 UNLISTED E&M SERVICE: CPT | Performed by: FAMILY MEDICINE

## 2025-03-03 PROCEDURE — G0439 PPPS, SUBSEQ VISIT: HCPCS | Performed by: FAMILY MEDICINE

## 2025-03-03 PROCEDURE — 80053 COMPREHEN METABOLIC PANEL: CPT | Performed by: FAMILY MEDICINE

## 2025-03-03 PROCEDURE — 80061 LIPID PANEL: CPT | Performed by: FAMILY MEDICINE

## 2025-03-03 PROCEDURE — 96160 PT-FOCUSED HLTH RISK ASSMT: CPT | Performed by: FAMILY MEDICINE

## 2025-03-03 NOTE — PROGRESS NOTES
HPI:   Avis Rios is a 89 year old female who presents for a Medicare Subsequent Annual Wellness visit (Pt already had Initial Annual Wellness).    Has some acid reflex.     Would like to get off of rosuvastatin. Also would like to get off eliquis.     Heart burn is okay. On PPI omeprazole every 2-3 days. Tried going off of it, didn't worse.     Following with Dr. Washington for breast cancer. Healing well. Last mammogram was normal.     Following with cardiology for atrial fib, doing well. Saw Dr. Holloway. Appointment in July.       in . She is keeping busy with friends, reading, knitting, [employer]. Lives alone, but her son is in the area.       She has been screened for Falls and is High Risk. Fall Prevention information provided to patient in After Visit Summary.    Do you feel unsteady when standing or walking?: Yes  Do you worry about falling?: Yes   She had a completely normal cognitive assessment - see flowsheet entries    Avis Rios has some abnormal functions as listed below:  She has Dressing and/or Bathing issues based on screening of functional status.  Difficulty dressing or bathing?: Yes  Bathing or Showering: Able without help  Dressing: Able without help  She has Hearing problems based on screening of functional status.She has Vision problems based on screening of functional status. She has Walking problems based on screening of functional status. She has problems with Daily Activities based on screening of functional status. She has problems with Memory based on screening of functional status.       Depression Screening (PHQ-2/PHQ-9): Over the LAST 2 WEEKS   Little interest or pleasure in doing things: Not at all  Feeling down, depressed, or hopeless: Not at all  PHQ-2 SCORE: 0      Advanced Directive:  She does NOT have a Living Will on file in [facility].   Advance care planning including the explanation and discussion of advance directives standard forms performed Face to Face with  patient and Family/surrogate (if present), and forms available to patient in AVS   POLST forms signed, see scan.      She does NOT have a Power of  for Health Care on file in [facility].   Advance care planning including the explanation and discussion of advance directives standard forms performed Face to Face with patient and Family/surrogate (if present), and forms available to patient in AVS       She smoked tobacco in the past but quit greater than 12 months ago.  Social History     Tobacco Use   Smoking Status Former    Current packs/day: 0.50    Average packs/day: 0.5 packs/day for 30.0 years (15.0 ttl pk-yrs)    Types: Cigarettes   Smokeless Tobacco Former   Tobacco Comments    quit 30 yrs ago        Ms. Rios does not currently take aspirin. We discussed the risks and benefits of aspirin therapy.   Avis Rios is unable to use daily aspirin therapy For the following reasons:   Already on other anticoagulant usage such as Plavix, Xarelto, Lovenox, or warfarin        CAGE Alcohol screening   Avis Rios was screened for Alcohol abuse and had a score of 0 so is at low risk.    Patient Care Team: Patient Care Team:  Piedad Boothe DO as PCP - General (Family Medicine)  Ankita Turcios, RN as Registered Nurse (Registered Nurse)    Patient Active Problem List:     Essential hypertension, benign - controlled today      Paroxysmal atrial fibrillation (HCC) - noted, controlled with metoprolol and on anticoagulation      Coronary artery disease involving native coronary artery of native heart without angina pectoris     H/O heart artery stent - noted.      Diastolic dysfunction - doing well, lasix as needed for swelling in LE     GERD (gastroesophageal reflux disease) - on PPI.      H/O acute myocardial infarction     Murmur     On statin therapy due to risk of future cardiovascular event     Presence of coronary angioplasty implant and graft     Venous insufficiency (chronic) (peripheral) - lasix as needed  for swelling helps.     Wt Readings from Last 3 Encounters:   03/03/25 198 lb 3.2 oz (89.9 kg)   01/28/25 198 lb 9.6 oz (90.1 kg)   12/17/24 204 lb 6.4 oz (92.7 kg)      Last Cholesterol Labs:   Lab Results   Component Value Date    CHOLEST 145 09/18/2023    HDL 62 (H) 09/18/2023    LDL 61 09/18/2023    TRIG 124 09/18/2023          Last Chemistry Labs:   Lab Results   Component Value Date    AST 17 03/06/2023    ALT 16 03/06/2023    CA 9.5 09/18/2023    ALB 3.5 03/06/2023    CREATSERUM 0.93 09/18/2023    GLU 97 09/18/2023        CBC  (most recent labs)   Lab Results   Component Value Date    WBC 5.0 03/06/2023    HGB 13.6 03/06/2023    .0 03/06/2023        ALLERGIES:   She is allergic to lipitor [atorvastatin].    CURRENT MEDICATIONS:   Outpatient Medications Marked as Taking for the 3/3/25 encounter (Office Visit) with Piedad Boothe DO   Medication Sig    Omeprazole 40 MG Oral Capsule Delayed Release Take 1 capsule (40 mg total) by mouth daily.    METOPROLOL SUCCINATE ER 50 MG Oral Tablet 24 Hr TAKE 1 TABLET DAILY    apixaban (ELIQUIS) 5 MG Oral Tab Take 1 tablet (5 mg total) by mouth 2 (two) times daily.    ROSUVASTATIN 5 MG Oral Tab TAKE 1 TABLET NIGHTLY      MEDICAL INFORMATION:   She  has a past medical history of Abdominal hernia, Arthritis, Atrial fibrillation (HCC), Back pain, Breast cancer (HCC), CAD (coronary artery disease), Easy bruising, High cholesterol, HTN (hypertension), Itch of skin, Leaking of urine, Pain in joints, Stented coronary artery, and Wears glasses.    She  has a past surgical history that includes angiogram (2012); appendectomy (1955); knee arthroscopy (Left, 1997); lysis of adhesions (1958); hysterectomy (1992); cataract (Bilateral); lumpectomy right; and appendectomy.    Her family history includes Asthma in her mother; Breast Cancer in her self; Heart Disease in her mother; congestive heart failure in her father; multiple myeloma (age of onset: 60) in her sister; rheumatoid  arthritis in her sister.   SOCIAL HISTORY:   She  reports that she has quit smoking. Her smoking use included cigarettes. She has a 15 pack-year smoking history. She has quit using smokeless tobacco. She reports current alcohol use of about 2.0 standard drinks of alcohol per week. She reports that she does not use drugs.     REVIEW OF SYSTEMS:   GENERAL: feels well otherwise  SKIN: denies any unusual skin lesions  EYES: denies blurred vision or double vision  HEENT: denies nasal congestion, sinus pain or ST  LUNGS: denies shortness of breath with exertion  CARDIOVASCULAR: denies chest pain on exertion  GI: denies abdominal pain, denies heartburn  : denies dysuria, vaginal discharge or itching, + urinary incontinence, she wears pads most of the time for leaks.    MUSCULOSKELETAL: denies back pain or joint pain   NEURO: denies headaches or dizziness   PSYCHE: denies depression or anxiety, coping okay with life. Misses her .   HEMATOLOGIC: denies hx of anemia  ENDOCRINE: denies thyroid history  ALL/ASTHMA: denies hx of allergy or asthma    EXAM:   /70   Pulse 76   Temp 97.4 °F (36.3 °C) (Temporal)   Resp 19   Wt 198 lb 3.2 oz (89.9 kg)   SpO2 97%   BMI 36.25 kg/m²  Estimated body mass index is 36.25 kg/m² as calculated from the following:    Height as of 3/6/23: 5' 2\" (1.575 m).    Weight as of this encounter: 198 lb 3.2 oz (89.9 kg).    Medicare Hearing Assessment  (Required for AWV/SWV)    Hearing Screening    Time taken: 3/3/2025 12:59 PM  Screening Method: Finger Rub  Finger Rub Result: Pass             Visual   Visual Acuity     Vision Screen Test Type: Snellen Wall Chart    Right Eye Visual Acuity: Corrected Right Eye Chart Acuity: 20/50   Left Eye Visual Acuity: Corrected Left Eye Chart Acuity: 20/50   Both Eyes Visual Acuity: Corrected Both Eyes Chart Acuity: 20/50         General Appearance:  Alert, cooperative, no distress, appears stated age   Head:  Normocephalic, without obvious  abnormality, atraumatic   Eyes:  PERRL, conjunctiva/corneas clear, EOM's intact both eyes   Ears:  Normal TM's and external ear canals, both ears   Nose: Nares normal, septum midline,mucosa normal, no drainage or sinus tenderness   Throat: Lips, mucosa, and tongue normal; teeth and gums normal   Neck: Supple, symmetrical, trachea midline, no adenopathy;  thyroid: not enlarged, symmetric, no tenderness/mass/nodules;     Back:   Symmetric, no curvature, ROM normal, no CVA tenderness   Lungs:   Clear to auscultation bilaterally, respirations unlabored   Heart:  Regular rate and rhythm, S1 and S2 normal, no murmur, rub, or gallop   Abdomen:   Soft, non-tender, bowel sounds active all four quadrants,  no masses, no organomegaly   Pelvic: Deferred   Extremities: Extremities normal, atraumatic, no cyanosis or edema   Pulses: 2+ and symmetric   Skin: Skin color, texture, turgor normal, no rashes or lesions   Lymph nodes: Cervical, supraclavicular, and axillary nodes normal   Neurologic: Normal       Vaccination History     Immunization History   Administered Date(s) Administered    Covid-19 Vaccine Pfizer 30 mcg/0.3 ml 02/24/2021, 04/08/2021, 10/19/2021    FLU VAC High Dose 65 YRS & Older PRSV Free (28858) 10/11/2019, 10/19/2021, 10/20/2022    Fluzone Vaccine Medicare () 10/11/2019    Pneumococcal (Prevnar 13) 12/10/2017, 10/20/2018    Pneumovax 23 12/09/2019        ASSESSMENT AND OTHER RELEVANT CHRONIC CONDITIONS:   Avis Rios is a 89 year old female who presents for a Medicare Assessment.     PLAN SUMMARY:   Diagnoses and all orders for this visit:    1. Encounter for annual health examination  Completed today.     2. Benign essential microscopic hematuria  Stable, just trace on check today. Normal urogram in Oct 2023.   - UA/M With Culture Reflex [E]; Future  - UA/M With Culture Reflex [E]    3. Hyperglycemia  - Hemoglobin A1C; Future  - Hemoglobin A1C    4. Severe obesity (BMI 35.0-39.9) with comorbidity  (HCC)  Stable, continue to stay active and eat healthy.     5. Paroxysmal atrial fibrillation (HCC)  Following with cardiology, no recent symptoms.     6. 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     7. Essential hypertension, benign  Controlled with current meds    8. Coronary artery disease involving native coronary artery of native heart without angina pectoris  Check labs as ordered.   Follow up with cardiology as well.   - CBC With Differential With Platelet; Future  - Comp Metabolic Panel (14); Future  - Lipid Panel; Future  - VENIPUNCTURE  - CBC With Differential With Platelet  - Comp Metabolic Panel (14)  - Lipid Panel    9. H/O heart artery stent  Noted. No new symptoms.     10. Diastolic dysfunction  Noted.     11. H/O acute myocardial infarction  No new symptoms. Following with cardiology.     12. Murmur  Not heard on exam today.     13. On statin therapy due to risk of future cardiovascular event  Doing well with statin therapy.     14. Presence of coronary angioplasty implant and graft  Noted.     15. Venous insufficiency (chronic) (peripheral)  Lasix as needed for swelling. Swelling isn't bad if she watches her salt intake.     16. History of breast cancer  Doing well. Does not want to do mammograms anymore.     17. Gastroesophageal reflux disease without esophagitis  Stable with current med.     18. Pulmonary fibrosis, unspecified (HCC)  Monitor for worsening SOB.        Diet assessment: good     PLAN:  The patient indicates understanding of these issues and agrees to the plan.  Reinforced healthy diet, lifestyle, and exercise.    No follow-ups on file.     Piedad Boothe DO, 12/7/2020     General Health     In the past six months, have you lost more than 10 pounds without trying?: 2 - No  Has your appetite been poor?: Yes  How does the patient maintain a good energy level?: Appropriate Exercise  How would you describe your daily physical activity?: Light  How would you  describe your current health state?: Good  How do you maintain positive mental well-being?: Puzzles;Games;Visiting Friends;Visiting Family      This section provided for quick review of chart, separate sheet to patient  PREVENTATIVE SERVICES  INDICATIONS AND SCHEDULE Internal Lab or Procedure External Lab or Procedure   Diabetes Screening      HbgA1C   Annually No results found for: \"A1C\"      No data to display                Fasting Blood Sugar (FSB)Annually Glucose (mg/dL)   Date Value   09/18/2023 97          Cardiovascular Disease Screening     LDL Annually LDL Cholesterol (mg/dL)   Date Value   09/18/2023 61        EKG - w/ Initial Preventative Physical Exam only, or if medically necessary Electrocardiogram date       Colorectal Cancer Screening      Colonoscopy Screen every 10 years No recommendations at this time Update Health Maintenance if applicable    Flex Sigmoidoscopy Screen every 10 years No results found for this or any previous visit.      No data to display                 Fecal Occult Blood Annually Occult Blood (no units)   Date Value   04/14/2021 Positive (A)         No data to display                Glaucoma Screening      Ophthalmology Visit Annually: Diabetics, FHx Glaucoma, AA>50, > 65      No data to display                Bone Density Screening      Dexascan Every two years Last Dexa Scan:    XR DEXA BONE DENSITOMETRY (CPT=77080) 03/01/2022        No data to display                Pap and Pelvic      Pap: Every 3 yrs age 21-65 or Pap+HPV every 5 yrs age 30-65, age 65 and older at high risk No recommendations at this time Update Health Maintenance if applicable    Chlamydia  Annually if high risk No results found for: \"CHLAMYDIA\"      No data to display                Screening Mammogram      Mammogram Annually to 75, then as discussed No recommendations at this time Update Health Maintenance if applicable     Immunizations (Update Immunization Activity if applicable)      Influenza  Covered Annually 10/20/2022 Please get every year    Pneumococcal 13 (Prevnar)  Covered Once after 65 12/10/2017 Please get once after your 65th birthday    Pneumococcal 23 (Pneumovax)  Covered Once after 65 12/09/2019 Please get once after your 65th birthday    Hepatitis B for Moderate/High Risk No vaccine history found Medium/high risk factors:   End-stage renal disease   Hemophiliacs who received Factor VIII or IX concentrates   Clients of institutions for the mentally retarded   Persons who live in the same house as a HepB virus carrier   Homosexual men   Illicit injectable drug abusers     Tetanus Toxoid  Only covered with a cut with metal- TD and TDaP Not covered by Medicare Part B No vaccine history found This may be covered with your prescription benefits, but Medicare does not cover unless Medically needed    Zoster  Not covered by Medicare Part B No vaccine history found This may be covered with your pharmacy  prescription benefits                        Template: SHAN ROMO MEDICARE ANNUAL ASSESSMENT FEMALE [76376]

## 2025-03-05 PROBLEM — R51.9 HEADACHE: Status: RESOLVED | Noted: 2024-04-20 | Resolved: 2025-03-05

## 2025-03-05 PROBLEM — J84.10 PULMONARY FIBROSIS, UNSPECIFIED (HCC): Status: ACTIVE | Noted: 2025-03-05

## 2025-04-02 ENCOUNTER — TELEPHONE (OUTPATIENT)
Dept: FAMILY MEDICINE CLINIC | Facility: CLINIC | Age: 89
End: 2025-04-02

## 2025-04-02 NOTE — TELEPHONE ENCOUNTER
Stay off rosuvastatin for 3-6 months and then we recheck lipids.   She should get 500 mg of calcium in diet. If she cannot do that, then take a supplement daily.

## 2025-04-02 NOTE — TELEPHONE ENCOUNTER
Patient's name and  verified     Patient is stopping the Rosuvastatin due the below message.    Patient stated she gets electric shock feeling in her stomach, legs, back and feet. Patient had this pain for a long time.     Patient stated she heard that stopping the Rosuvastatin helps stop this pain.     Patient took the last pill last night.    Patient wants to know when she should repeat her cholesterol labs- 3 or 6 months lab     Patient also wants to know if she should start taking calcium.    Please Advise

## 2025-04-02 NOTE — TELEPHONE ENCOUNTER
Patient is going to stop ROSUVASTATIN 5 MG Oral Tab  due to potential side effects    She would like to stay off and then retest cholesterol    She would like to know when to check labs    Please adv  Thank you

## 2025-04-11 DIAGNOSIS — K21.9 GASTROESOPHAGEAL REFLUX DISEASE, UNSPECIFIED WHETHER ESOPHAGITIS PRESENT: ICD-10-CM

## 2025-04-11 RX ORDER — ROSUVASTATIN CALCIUM 5 MG/1
5 TABLET, COATED ORAL NIGHTLY
Qty: 90 TABLET | Refills: 1 | Status: SHIPPED | OUTPATIENT
Start: 2025-04-11

## 2025-04-11 RX ORDER — OMEPRAZOLE 40 MG/1
40 CAPSULE, DELAYED RELEASE ORAL DAILY
Qty: 90 CAPSULE | Refills: 0 | Status: SHIPPED | OUTPATIENT
Start: 2025-04-11

## 2025-04-11 NOTE — TELEPHONE ENCOUNTER
Gastrointestional Medication Protocol Passed04/11/2025 01:09 AM   Protocol Details In person appointment or virtual visit in the past 12 mos or appointment in next 3 mos    Medication is active on med list     Cholesterol Medication Protocol Passed04/11/2025 01:09 AM   Protocol Details ALT < 80    ALT resulted within past year    Lipid panel within past 12 months    In person appointment or virtual visit in the past 12 mos or appointment in next 3 mos    Medication is active on med list

## 2025-04-18 DIAGNOSIS — I10 ESSENTIAL HYPERTENSION, BENIGN: ICD-10-CM

## 2025-04-18 DIAGNOSIS — I48.91 ATRIAL FIBRILLATION WITH TACHYCARDIC VENTRICULAR RATE (HCC): ICD-10-CM

## 2025-04-18 DIAGNOSIS — I48.0 PAROXYSMAL ATRIAL FIBRILLATION (HCC): ICD-10-CM

## 2025-04-18 RX ORDER — METOPROLOL SUCCINATE 50 MG/1
50 TABLET, EXTENDED RELEASE ORAL DAILY
Qty: 90 TABLET | Refills: 0 | Status: SHIPPED | OUTPATIENT
Start: 2025-04-18 | End: 2025-04-18

## 2025-04-18 RX ORDER — METOPROLOL SUCCINATE 50 MG/1
50 TABLET, EXTENDED RELEASE ORAL DAILY
Qty: 90 TABLET | Refills: 3 | Status: SHIPPED | OUTPATIENT
Start: 2025-04-18

## 2025-04-18 NOTE — TELEPHONE ENCOUNTER
Last OV 3/3/25  Last labs 3/3/25  Last refilled:  11/1/24 apixaban #180  1 refill  1/6/25  metoprolol  #90  0 refill

## 2025-04-18 NOTE — TELEPHONE ENCOUNTER
Patient requests refill    apixaban (ELIQUIS) 5 MG Oral Tab     METOPROLOL SUCCINATE ER 50 MG Oral Tablet 24 Hr     [facility] mail    Please adv  Thank you

## 2025-04-22 ENCOUNTER — TELEPHONE (OUTPATIENT)
Dept: FAMILY MEDICINE CLINIC | Facility: CLINIC | Age: 89
End: 2025-04-22

## 2025-04-22 NOTE — TELEPHONE ENCOUNTER
PT CALLED AND ADV THAT SHE HAS DISCONTINUED TAKING :    ROSUVASTATIN 5 MG Oral Tab     PLEASE DISCONTINUE ON MED LIST - PT WILL REACH OUT TO MAIL ORDER AND DISCONTINUE As WELL.      THANK YOU

## 2025-04-22 NOTE — TELEPHONE ENCOUNTER
Spoke with patient who states she is not having any side effects.  Does not wish to continue taking.  Advised patient Dr Boothe will continue to monitor lipids with lab work    Med removed from med list

## 2025-07-14 DIAGNOSIS — K21.9 GASTROESOPHAGEAL REFLUX DISEASE, UNSPECIFIED WHETHER ESOPHAGITIS PRESENT: ICD-10-CM

## 2025-07-18 ENCOUNTER — TELEPHONE (OUTPATIENT)
Dept: FAMILY MEDICINE CLINIC | Facility: CLINIC | Age: 89
End: 2025-07-18

## 2025-07-18 RX ORDER — OMEPRAZOLE 40 MG/1
40 CAPSULE, DELAYED RELEASE ORAL DAILY
Qty: 90 CAPSULE | Refills: 3 | Status: SHIPPED | OUTPATIENT
Start: 2025-07-18

## 2025-07-18 NOTE — TELEPHONE ENCOUNTER
Patient notified eliquis cancelled with mail order.    Patient states she is currently in hospital. ([facility] notes in care everywhere)  States she is feeling ok, will likely go home Monday. Will be discharged on coumadin. Patient will call to schedule f/u with Dr Boothe after discharge.

## 2025-07-18 NOTE — TELEPHONE ENCOUNTER
Called and spoke with Missy SMALLS at [facility] and cancelled Eliquis 5 mg tabs per patient request

## 2025-07-18 NOTE — TELEPHONE ENCOUNTER
Refill Per Protocol     Requested Prescriptions   Pending Prescriptions Disp Refills    OMEPRAZOLE 40 MG Oral Capsule Delayed Release [Pharmacy Med Name: OMEPRAZOLE CAP 40MG] 90 capsule 0     Sig: TAKE 1 CAPSULE DAILY       Gastrointestional Medication Protocol Passed - 7/18/2025  8:28 AM        Passed - In person appointment or virtual visit in the past 12 mos or appointment in next 3 mos     Recent Outpatient Visits              4 months ago Encounter for annual health examination    [facility] Piedad Boothe, DO    Office Visit    5 months ago Chest pain, unspecified type    [facility] Piedad Boothe, DO    Office Visit    7 months ago Acute right-sided low back pain with right-sided sciatica    [facility] Piedad Boothe,     Office Visit    1 year ago Edema, unspecified type    [facility] Bryant Castanon MD    Office Visit    1 year ago Nonintractable headache, unspecified chronicity pattern, unspecified headache type    [facility] Piedad Boothe, DO    Office Visit                      Passed - Medication is active on med list

## 2025-07-18 NOTE — TELEPHONE ENCOUNTER
PT CALLED AND ADV IS IN THE HOSPITAL W/PNEUMONIA AND BLOOD CLOTS.    PT ADV THAT THEY ARE GOING TO BE TAKING PT OFF ELIQUIS AND REPLACING IT WITH COUMADIN    PT WOULD LIKE TO KNOW IF WE CAN CALL [facility] MAILORDER AND CANCEL ELIQUIS    DOES NOT WANT TO BE CHARGE FOR THE MEDICATION IF THEY CHANGE IT UP    PLEASE ADV    THANK YOU

## 2025-07-21 ENCOUNTER — TELEPHONE (OUTPATIENT)
Dept: FAMILY MEDICINE CLINIC | Facility: CLINIC | Age: 89
End: 2025-07-21

## 2025-07-21 NOTE — TELEPHONE ENCOUNTER
PT CALLED AND ADV IS STILL IN THE HOSPITAL AND SHE SAID HAS TO SPEAK WITH DR MARTE - ITS VERY IMPORTANT.    WOULDN'T GIVE ANY OTHER INFO    PLEASE ADV    THANK YOU

## 2025-07-21 NOTE — TELEPHONE ENCOUNTER
Patient states she is being discharged from hospital with orders to have PT/INR Wed or thurs and needs follow up with Dr Boothe this week.    Appt scheduled Thursday with Dr Boothe. Patient aware INR will be drawn at appt    Future Appointments   Date Time Provider Department Center   7/24/2025  9:30 AM Piedad Boothe DO [facility] [place]

## 2025-07-24 ENCOUNTER — OFFICE VISIT (OUTPATIENT)
Dept: FAMILY MEDICINE CLINIC | Facility: CLINIC | Age: 89
End: 2025-07-24
Payer: MEDICARE

## 2025-07-24 VITALS
DIASTOLIC BLOOD PRESSURE: 72 MMHG | BODY MASS INDEX: 35 KG/M2 | OXYGEN SATURATION: 98 % | SYSTOLIC BLOOD PRESSURE: 120 MMHG | HEART RATE: 87 BPM | RESPIRATION RATE: 18 BRPM | WEIGHT: 191.63 LBS | TEMPERATURE: 97 F

## 2025-07-24 DIAGNOSIS — E66.01 SEVERE OBESITY (BMI 35.0-39.9) WITH COMORBIDITY (HCC): ICD-10-CM

## 2025-07-24 DIAGNOSIS — Z79.01 WARFARIN ANTICOAGULATION: Primary | ICD-10-CM

## 2025-07-24 DIAGNOSIS — J18.9 COMMUNITY ACQUIRED PNEUMONIA OF RIGHT MIDDLE LOBE OF LUNG: ICD-10-CM

## 2025-07-24 DIAGNOSIS — I82.4Y3 ACUTE DEEP VEIN THROMBOSIS (DVT) OF PROXIMAL VEIN OF BOTH LOWER EXTREMITIES (HCC): ICD-10-CM

## 2025-07-24 PROBLEM — J96.01 ACUTE RESPIRATORY FAILURE WITH HYPOXIA (HCC): Status: RESOLVED | Noted: 2025-07-16 | Resolved: 2025-07-24

## 2025-07-24 PROBLEM — R09.02 HYPOXEMIA: Status: ACTIVE | Noted: 2025-07-16

## 2025-07-24 PROBLEM — I82.4Z3 LOWER LEG DVT (DEEP VENOUS THROMBOEMBOLISM), ACUTE, BILATERAL (HCC): Status: ACTIVE | Noted: 2025-07-16

## 2025-07-24 PROBLEM — I21.A1 TYPE 2 MI (MYOCARDIAL INFARCTION) (HCC): Status: ACTIVE | Noted: 2025-07-16

## 2025-07-24 PROBLEM — E66.9 OBESITY (BMI 30-39.9): Status: ACTIVE | Noted: 2025-07-22

## 2025-07-24 PROBLEM — E83.42 HYPOMAGNESEMIA: Status: ACTIVE | Noted: 2025-07-16

## 2025-07-24 PROBLEM — E78.5 HYPERLIPIDEMIA: Status: ACTIVE | Noted: 2025-07-16

## 2025-07-24 PROBLEM — J96.01 ACUTE RESPIRATORY FAILURE WITH HYPOXIA (HCC): Status: ACTIVE | Noted: 2025-07-16

## 2025-07-24 PROBLEM — I82.409 ACUTE DVT (DEEP VENOUS THROMBOSIS) (HCC): Status: ACTIVE | Noted: 2025-07-16

## 2025-07-24 LAB
INR BLD: 2.56 (ref 0.8–1.2)
PROTHROMBIN TIME: 27.8 SECONDS (ref 11.6–14.8)

## 2025-07-24 PROCEDURE — 99214 OFFICE O/P EST MOD 30 MIN: CPT | Performed by: FAMILY MEDICINE

## 2025-07-24 PROCEDURE — 1111F DSCHRG MED/CURRENT MED MERGE: CPT | Performed by: FAMILY MEDICINE

## 2025-07-24 PROCEDURE — 1160F RVW MEDS BY RX/DR IN RCRD: CPT | Performed by: FAMILY MEDICINE

## 2025-07-24 PROCEDURE — 85610 PROTHROMBIN TIME: CPT | Performed by: FAMILY MEDICINE

## 2025-07-24 PROCEDURE — 1159F MED LIST DOCD IN RCRD: CPT | Performed by: FAMILY MEDICINE

## 2025-07-24 RX ORDER — WARFARIN SODIUM 5 MG/1
5 TABLET ORAL DAILY
COMMUNITY
Start: 2025-07-22 | End: 2025-08-21

## 2025-07-24 NOTE — PROGRESS NOTES
Avis Rios is a 89 year old female.  Chief Complaint   Patient presents with    Follow - Up     Hospital Follow up       HPI:   7/16/25 was SOB, woke up at 4 am. Had swelling in feet, SOB. Called 911. Brought to [facility].   Was dx with PNA and b/l DVT's. No PE. She was already on eliquis. Stopped that, started warfarin.   Discharged 2 days ago, 7/22/25.   Taking warfarin 5 mg once daily.   Due for INR.   Regular BM's, No blood.   Urinating okay. No blood.   Breathing is better, not SOB. No coughing.   Swelling in feet/legs is back down to almost normal.   No pain.   Otherwise feeling okay. No chest pain or pressure.       ALLERGIES:  Allergies   Allergen Reactions    Atorvastatin ITCHING         Current Outpatient Medications   Medication Sig Dispense Refill    warfarin 5 MG Oral Tab Take 1 tablet (5 mg total) by mouth daily.      metoprolol succinate ER 50 MG Oral Tablet 24 Hr Take 1 tablet (50 mg total) by mouth daily. 90 tablet 3    Omeprazole 40 MG Oral Capsule Delayed Release Take 1 capsule (40 mg total) by mouth daily. (Patient not taking: Reported on 7/24/2025) 90 capsule 3    apixaban (ELIQUIS) 5 MG Oral Tab Take 1 tablet (5 mg total) by mouth 2 (two) times daily. (Patient not taking: Reported on 7/24/2025) 180 tablet 1    metoprolol succinate ER 50 MG Oral Tablet 24 Hr Take 1 tablet (50 mg total) by mouth daily. (Patient not taking: Reported on 7/24/2025) 10 tablet 0    omeprazole 20 MG Oral Capsule Delayed Release Take 1 capsule (20 mg total) by mouth every morning before breakfast. (Patient not taking: Reported on 7/24/2025) 90 capsule 0    Multiple Vitamins-Minerals (CENTRUM SILVER) Oral Tab Take 1 tablet by mouth daily. (Patient not taking: Reported on 7/24/2025)        Past Medical History:    Abdominal hernia    Arthritis    Atrial fibrillation (HCC)    Back pain    Breast cancer (HCC)    CAD (coronary artery disease)    Easy bruising    High cholesterol    HTN (hypertension)    Itch of skin     Leaking of urine    Pain in joints    Stented coronary artery    Wears glasses      Social History:  Social History     Socioeconomic History    Marital status:    Tobacco Use    Smoking status: Former     Current packs/day: 0.50     Average packs/day: 0.5 packs/day for 30.0 years (15.0 ttl pk-yrs)     Types: Cigarettes    Smokeless tobacco: Former    Tobacco comments:     quit 30 yrs ago   Vaping Use    Vaping status: Never Used   Substance and Sexual Activity    Alcohol use: Yes     Alcohol/week: 2.0 standard drinks of alcohol     Types: 2 Glasses of wine per week     Comment: sometime daily    Drug use: Never     Social Drivers of Health     Food Insecurity: No Food Insecurity (7/16/2025)    Received from [facility]    Food Insecurity     Currently or in the past 3 months, have you worried your food would run out before you had money to buy more?: No     In the past 12 months, have you run out of food or been unable to get more?: No   Transportation Needs: No Transportation Needs (7/16/2025)    Received from [facility]    Transportation Needs     Currently or in the past 3 months, has lack of transportation kept you from medical appointments, getting food or medicine, or providing care to a family member?: No   Housing Stability: Not At Risk (1/28/2025)    NCSS - Housing/Utilities     Has Housing: Yes     Worried About Losing Housing: No     Unable to Get Utilities: No        BP Readings from Last 6 Encounters:   07/24/25 120/72   03/03/25 130/70   01/28/25 130/86   12/17/24 136/70   07/12/24 116/72   04/22/24 126/70       Wt Readings from Last 6 Encounters:   07/24/25 191 lb 9.6 oz (86.9 kg)   03/03/25 198 lb 3.2 oz (89.9 kg)   01/28/25 198 lb 9.6 oz (90.1 kg)   12/17/24 204 lb 6.4 oz (92.7 kg)   07/12/24 202 lb (91.6 kg)   04/22/24 203 lb 9.6 oz (92.4 kg)       REVIEW OF SYSTEMS:   GENERAL HEALTH: feels well no complaints other than above   SKIN: denies any  unusual skin lesions or rashes  RESPIRATORY: denies shortness of breath    CARDIOVASCULAR: denies chest pain    GI: denies abdominal pain and denies heartburn  NEURO: denies headaches    EXAM:   /72   Pulse 87   Temp 97.3 °F (36.3 °C) (Temporal)   Resp 18   Wt 191 lb 9.6 oz (86.9 kg)   SpO2 98%   BMI 35.04 kg/m²  Body mass index is 35.04 kg/m².      GENERAL: well developed, well nourished,in no apparent distress  SKIN: no rashes,no suspicious lesions  HEENT: atraumatic, normocephalic,ears and throat are clear  NECK: supple,no adenopathy,   LUNGS: clear to auscultation  CARDIO: RRR without murmur  GI: good BS's,no masses, HSM or tenderness  EXTREMITIES: no cyanosis, clubbing or edema    ASSESSMENT AND PLAN:     Encounter Diagnoses   Name Primary?    Warfarin anticoagulation Yes    Acute deep vein thrombosis (DVT) of proximal vein of both lower extremities (HCC)     Severe obesity (BMI 35.0-39.9) with comorbidity (HCC)     Community acquired pneumonia of right middle lobe of lung        Diagnoses and all orders for this visit:    Warfarin anticoagulation  -     Prothrombin Time (PT) [E]; Standing  - check INR, adjust warfarin dose if needed.     Acute deep vein thrombosis (DVT) of proximal vein of both lower extremities (HCC)  -     Prothrombin Time (PT) [E]; Standing    Severe obesity (BMI 35.0-39.9) with comorbidity (HCC)  - losing weight, continue healthy eating and exercise.     Community acquired pneumonia of right middle lobe of lung  - resolved. Exam normal. She is feeling back to normal as well. Treated with IV abx while inpatient.       Orders Placed This Encounter   Procedures    Prothrombin Time (PT) [E]     Standing Status:   Standing     Number of Occurrences:   52     Expiration Date:   7/24/2026     Release to patient:   Immediate               Meds & Refills for this Visit:  Requested Prescriptions      No prescriptions requested or ordered in this encounter             The patient  indicates understanding of these issues and agrees to the plan.

## 2025-07-28 ENCOUNTER — TELEPHONE (OUTPATIENT)
Dept: FAMILY MEDICINE CLINIC | Facility: CLINIC | Age: 89
End: 2025-07-28

## 2025-07-28 NOTE — TELEPHONE ENCOUNTER
Discussed with Dr Boothe who states ok to delay start of PT    Donis notified and verbalized understanding.

## 2025-07-28 NOTE — TELEPHONE ENCOUNTER
Can you clarify what medicine she took yesterday?     If it's famotidine OTC, she can take 20 mg BID if needed. Take 30 min before breakfast and dinner.

## 2025-07-28 NOTE — TELEPHONE ENCOUNTER
TOY CALLED TO ADV THAT HE NEEDS UPDATED ORDER AND WANTS TO KNOW IF DR WILL SIGN      DELAY ORDER FOR PHYSICAL THERAPY     ** [facility] PT WAS NOT ABLE TO BE SEEN W/IN THE 48 HOUR WINDOW **    PLEASE ADV    THANK YOU

## 2025-07-28 NOTE — TELEPHONE ENCOUNTER
Pt states she has been in the hospital for 2 blood clots and pneumonia.  While there they took her off Eliquis because they said it wasn't helping. Hospital also told her to stop taking acid reflux medication because it would interfere with Warfarin.  Pt bought acid reflux 20mg and took 3-4 hours before she ate - took for first time yesterday.  States she had a acid reflux reaction last that night still.  Wondering if she can take 40 mg of acid reflux medication instead.  Pt states she was taking 40 mg before and had no problems.   Please advise  Thank you!

## 2025-07-29 ENCOUNTER — TELEPHONE (OUTPATIENT)
Dept: FAMILY MEDICINE CLINIC | Facility: CLINIC | Age: 89
End: 2025-07-29

## 2025-07-29 ENCOUNTER — MED REC SCAN ONLY (OUTPATIENT)
Dept: FAMILY MEDICINE CLINIC | Facility: CLINIC | Age: 89
End: 2025-07-29

## 2025-07-29 RX ORDER — WARFARIN SODIUM 5 MG/1
5 TABLET ORAL DAILY
Qty: 90 TABLET | Refills: 0 | Status: SHIPPED | OUTPATIENT
Start: 2025-07-29

## 2025-07-31 ENCOUNTER — TELEPHONE (OUTPATIENT)
Dept: FAMILY MEDICINE CLINIC | Facility: CLINIC | Age: 89
End: 2025-07-31

## 2025-08-01 ENCOUNTER — LAB ENCOUNTER (OUTPATIENT)
Dept: LAB | Age: 89
End: 2025-08-01
Attending: FAMILY MEDICINE

## 2025-08-01 DIAGNOSIS — Z79.01 WARFARIN ANTICOAGULATION: ICD-10-CM

## 2025-08-01 DIAGNOSIS — I82.4Y3 ACUTE DEEP VEIN THROMBOSIS (DVT) OF PROXIMAL VEIN OF BOTH LOWER EXTREMITIES (HCC): ICD-10-CM

## 2025-08-01 LAB
INR BLD: 6.54 (ref 0.8–1.2)
PROTHROMBIN TIME: 57.7 SECONDS (ref 11.6–14.8)

## 2025-08-01 PROCEDURE — 85610 PROTHROMBIN TIME: CPT

## 2025-08-01 PROCEDURE — 36415 COLL VENOUS BLD VENIPUNCTURE: CPT

## 2025-08-05 ENCOUNTER — MED REC SCAN ONLY (OUTPATIENT)
Dept: FAMILY MEDICINE CLINIC | Facility: CLINIC | Age: 89
End: 2025-08-05

## 2025-08-07 ENCOUNTER — TELEPHONE (OUTPATIENT)
Dept: FAMILY MEDICINE CLINIC | Facility: CLINIC | Age: 89
End: 2025-08-07

## 2025-08-07 DIAGNOSIS — Z79.01 WARFARIN ANTICOAGULATION: Primary | ICD-10-CM

## 2025-08-07 RX ORDER — WARFARIN SODIUM 3 MG/1
3 TABLET ORAL DAILY
Qty: 30 TABLET | Refills: 0 | Status: SHIPPED | OUTPATIENT
Start: 2025-08-07

## 2025-08-07 RX ORDER — WARFARIN SODIUM 3 MG/1
3 TABLET ORAL DAILY
Qty: 30 TABLET | Refills: 0 | Status: SHIPPED | OUTPATIENT
Start: 2025-08-07 | End: 2025-08-07

## 2025-08-11 ENCOUNTER — TELEPHONE (OUTPATIENT)
Dept: FAMILY MEDICINE CLINIC | Facility: CLINIC | Age: 89
End: 2025-08-11

## 2025-08-13 ENCOUNTER — TELEPHONE (OUTPATIENT)
Dept: FAMILY MEDICINE CLINIC | Facility: CLINIC | Age: 89
End: 2025-08-13

## 2025-08-20 ENCOUNTER — TELEPHONE (OUTPATIENT)
Dept: FAMILY MEDICINE CLINIC | Facility: CLINIC | Age: 89
End: 2025-08-20

## 2025-08-22 ENCOUNTER — OFFICE VISIT (OUTPATIENT)
Dept: FAMILY MEDICINE CLINIC | Facility: CLINIC | Age: 89
End: 2025-08-22

## 2025-08-22 VITALS
SYSTOLIC BLOOD PRESSURE: 128 MMHG | TEMPERATURE: 97 F | OXYGEN SATURATION: 99 % | WEIGHT: 195.63 LBS | HEART RATE: 76 BPM | BODY MASS INDEX: 36 KG/M2 | RESPIRATION RATE: 20 BRPM | DIASTOLIC BLOOD PRESSURE: 70 MMHG

## 2025-08-22 DIAGNOSIS — R10.32 LEFT LOWER QUADRANT ABDOMINAL PAIN: Primary | ICD-10-CM

## 2025-08-22 LAB
ALBUMIN SERPL-MCNC: 4.1 G/DL (ref 3.2–4.8)
ALBUMIN/GLOB SERPL: 1.8 (ref 1–2)
ALP LIVER SERPL-CCNC: 59 U/L (ref 55–142)
ALT SERPL-CCNC: 15 U/L (ref 10–49)
ANION GAP SERPL CALC-SCNC: 11 MMOL/L (ref 0–18)
AST SERPL-CCNC: 20 U/L (ref ?–34)
BASOPHILS # BLD AUTO: 0.03 X10(3) UL (ref 0–0.2)
BASOPHILS NFR BLD AUTO: 0.7 %
BILIRUB SERPL-MCNC: 1.6 MG/DL (ref 0.2–1.1)
BUN BLD-MCNC: 13 MG/DL (ref 9–23)
CALCIUM BLD-MCNC: 10.5 MG/DL (ref 8.7–10.6)
CHLORIDE SERPL-SCNC: 101 MMOL/L (ref 98–112)
CO2 SERPL-SCNC: 30 MMOL/L (ref 21–32)
CREAT BLD-MCNC: 0.98 MG/DL (ref 0.55–1.02)
EGFRCR SERPLBLD CKD-EPI 2021: 56 ML/MIN/1.73M2 (ref 60–?)
EOSINOPHIL # BLD AUTO: 0.07 X10(3) UL (ref 0–0.7)
EOSINOPHIL NFR BLD AUTO: 1.6 %
ERYTHROCYTE [DISTWIDTH] IN BLOOD BY AUTOMATED COUNT: 13.7 %
FASTING STATUS PATIENT QL REPORTED: NO
GLOBULIN PLAS-MCNC: 2.3 G/DL (ref 2–3.5)
GLUCOSE BLD-MCNC: 97 MG/DL (ref 70–99)
HCT VFR BLD AUTO: 40 % (ref 35–48)
HGB BLD-MCNC: 13.7 G/DL (ref 12–16)
IMM GRANULOCYTES # BLD AUTO: 0.01 X10(3) UL (ref 0–1)
IMM GRANULOCYTES NFR BLD: 0.2 %
LYMPHOCYTES # BLD AUTO: 1.2 X10(3) UL (ref 1–4)
LYMPHOCYTES NFR BLD AUTO: 27.5 %
MCH RBC QN AUTO: 31 PG (ref 26–34)
MCHC RBC AUTO-ENTMCNC: 34.3 G/DL (ref 31–37)
MCV RBC AUTO: 90.5 FL (ref 80–100)
MONOCYTES # BLD AUTO: 0.5 X10(3) UL (ref 0.1–1)
MONOCYTES NFR BLD AUTO: 11.5 %
NEUTROPHILS # BLD AUTO: 2.55 X10 (3) UL (ref 1.5–7.7)
NEUTROPHILS # BLD AUTO: 2.55 X10(3) UL (ref 1.5–7.7)
NEUTROPHILS NFR BLD AUTO: 58.5 %
OSMOLALITY SERPL CALC.SUM OF ELEC: 294 MOSM/KG (ref 275–295)
PLATELET # BLD AUTO: 162 10(3)UL (ref 150–450)
POTASSIUM SERPL-SCNC: 4.4 MMOL/L (ref 3.5–5.1)
PROT SERPL-MCNC: 6.4 G/DL (ref 5.7–8.2)
RBC # BLD AUTO: 4.42 X10(6)UL (ref 3.8–5.3)
SODIUM SERPL-SCNC: 142 MMOL/L (ref 136–145)
WBC # BLD AUTO: 4.4 X10(3) UL (ref 4–11)

## 2025-08-22 PROCEDURE — 80053 COMPREHEN METABOLIC PANEL: CPT | Performed by: FAMILY MEDICINE

## 2025-08-22 PROCEDURE — 85025 COMPLETE CBC W/AUTO DIFF WBC: CPT | Performed by: FAMILY MEDICINE

## 2025-08-22 PROCEDURE — 1159F MED LIST DOCD IN RCRD: CPT | Performed by: FAMILY MEDICINE

## 2025-08-22 PROCEDURE — 99214 OFFICE O/P EST MOD 30 MIN: CPT | Performed by: FAMILY MEDICINE

## 2025-08-22 PROCEDURE — 1160F RVW MEDS BY RX/DR IN RCRD: CPT | Performed by: FAMILY MEDICINE

## 2025-08-29 ENCOUNTER — HOME HEALTH CHARGES (OUTPATIENT)
Dept: FAMILY MEDICINE CLINIC | Facility: CLINIC | Age: 89
End: 2025-08-29

## 2025-08-29 ENCOUNTER — TELEPHONE (OUTPATIENT)
Dept: FAMILY MEDICINE CLINIC | Facility: CLINIC | Age: 89
End: 2025-08-29

## 2025-08-29 DIAGNOSIS — J18.9 PNEUMONIA DUE TO INFECTIOUS ORGANISM, UNSPECIFIED LATERALITY, UNSPECIFIED PART OF LUNG: Primary | ICD-10-CM

## (undated) DIAGNOSIS — I48.0 PAROXYSMAL ATRIAL FIBRILLATION (HCC): Primary | ICD-10-CM

## (undated) DIAGNOSIS — I48.0 PAROXYSMAL ATRIAL FIBRILLATION (HCC): ICD-10-CM

## (undated) DEVICE — LIGHT HANDLE

## (undated) DEVICE — UNDERPAD 23X36 LIGHT ASBORB

## (undated) DEVICE — DRAPE PACK CHEST & U BAR

## (undated) DEVICE — SUTURE VICRYL 3-0 SH

## (undated) DEVICE — ABDOMINAL PAD: Brand: DERMACEA

## (undated) DEVICE — DRAPE,TAPE STRIPS,STERILE: Brand: MEDLINE

## (undated) DEVICE — GOWN,SIRUS,FABRIC-REINFORCED,LARGE: Brand: MEDLINE

## (undated) DEVICE — TOWEL SURG OR 17X30IN BLUE

## (undated) DEVICE — HEMOCLIP HORIZON MED 002200

## (undated) DEVICE — SUTURE MONOCRYL 4-0 PS-2

## (undated) DEVICE — SCD SLEEVE KNEE HI BLEND

## (undated) DEVICE — SOL  .9 1000ML BTL

## (undated) DEVICE — BREAST-HERNIA-PORT CDS-LF: Brand: MEDLINE INDUSTRIES, INC.

## (undated) DEVICE — STERILE POLYISOPRENE POWDER-FREE SURGICAL GLOVES: Brand: PROTEXIS

## (undated) DEVICE — HEMOCLIP HORIZON SM 001200

## (undated) DEVICE — 3M™ STERI-DRAPE™ INSTRUMENT POUCH 1018: Brand: STERI-DRAPE™

## (undated) DEVICE — SUTURE SILK 2-0 FS

## (undated) DEVICE — Device

## (undated) NOTE — Clinical Note
Fax along with labs and EKG. Also, [employer] told her she has a $700 bill. She is wondering what that is, is it the eliquis? Is there an alternative we can use. Can you call [facility] and see what that is?

## (undated) NOTE — LETTER
[address] [id number]           Dear Myra Oden records indicate that you have outstanding lab work and or testing that was ordered for you and has not yet been completed:  Lab Frequency Next Occurrence   BASIC METABOLIC PANEL (8) Once 96/99/6563      To provide you with the best possible care, please complete these orders at your earliest convenience. If you have recently completed these orders please disregard this letter. If you have any questions please call the office at [phone].      Thank you,     [facility]

## (undated) NOTE — LETTER
[facility] Department  Phone: [phone]  OUTSIDE TESTING RESULT REQUEST      TO:   Humphrey Montiel Today's Date: 22    FAX #: [phone]     IMPORTANT: FOR YOUR IMMEDIATE ATTENTION  Please FAX all test results listed below to: [phone]          Patient Name: Almas Shultz  Surgery Date: 2/3/2022    CSN: [id number]  Medical Record: [id number]   : 1935 - A: 82 y      Sex: female  Surgeon(s):  Danielle Kelly MD  Procedure:  Right breast wire localized lumpectomy  Anesthesia Type: MAC     Surgeon: Danielle Kelly MD       The following Testing and Time Line are REQUIRED PER ANESTHESIA/SURGEON     BASIC METABOLIC PANEL/EKG. Appt at your office       Thank You,   Sent by: Hillary AKBAR Rn      CONFIDENTIALITY NOTICE  This transmission is intended only for the use of the individual or entity to which it is addressed and may contain information that is privileged and confidential.  If the reader of this message is not the intended recipient, you are hereby notified that any disclosure, distribution, or copying of this information is strictly prohibited. If you have received this transmission in error, please notify us immediately by telephone, and return the original documents to us at the address listed above.            Yohannes  5/13/15

## (undated) NOTE — LETTER
Avis Rios   [address]  [facility] [address]           Dear Avis Rios     Our records indicate that you have outstanding lab work and or testing that was ordered for you and has not yet been completed:  Lab Frequency Next Occurrence   CBC With Differential With Platelet Once 03/12/2024   Comp Metabolic Panel (14) Once 03/12/2024   Lipid Panel Once 03/12/2024      To provide you with the best possible care, please complete these orders at your earliest convenience. If you have recently completed these orders please disregard this letter.     If you have any questions please call the office at [phone].     Thank you,     [facility]